# Patient Record
Sex: FEMALE | Race: WHITE | NOT HISPANIC OR LATINO | URBAN - METROPOLITAN AREA
[De-identification: names, ages, dates, MRNs, and addresses within clinical notes are randomized per-mention and may not be internally consistent; named-entity substitution may affect disease eponyms.]

---

## 2019-03-15 ENCOUNTER — EMERGENCY (EMERGENCY)
Facility: HOSPITAL | Age: 54
LOS: 1 days | Discharge: HOME | End: 2019-03-15
Attending: EMERGENCY MEDICINE | Admitting: EMERGENCY MEDICINE
Payer: COMMERCIAL

## 2019-03-15 VITALS
DIASTOLIC BLOOD PRESSURE: 75 MMHG | OXYGEN SATURATION: 95 % | HEART RATE: 71 BPM | HEIGHT: 65 IN | WEIGHT: 154.98 LBS | SYSTOLIC BLOOD PRESSURE: 106 MMHG | RESPIRATION RATE: 18 BRPM | TEMPERATURE: 96 F

## 2019-03-15 PROCEDURE — 73030 X-RAY EXAM OF SHOULDER: CPT

## 2019-03-15 PROCEDURE — 73080 X-RAY EXAM OF ELBOW: CPT

## 2019-03-15 PROCEDURE — 99284 EMERGENCY DEPT VISIT MOD MDM: CPT | Mod: 25

## 2019-03-15 PROCEDURE — 99284 EMERGENCY DEPT VISIT MOD MDM: CPT

## 2019-03-15 PROCEDURE — 73060 X-RAY EXAM OF HUMERUS: CPT

## 2019-03-15 RX ORDER — OXYCODONE AND ACETAMINOPHEN 5; 325 MG/1; MG/1
1 TABLET ORAL ONCE
Qty: 0 | Refills: 0 | Status: DISCONTINUED | OUTPATIENT
Start: 2019-03-15 | End: 2019-03-15

## 2019-03-15 RX ADMIN — OXYCODONE AND ACETAMINOPHEN 1 TABLET(S): 5; 325 TABLET ORAL at 22:47

## 2019-03-15 NOTE — ED ADULT NURSE NOTE - OBJECTIVE STATEMENT
Patient stated she fell from a kitchen stool complaining of pain to right shoulder and elbow pain, and left pain with pain level 7/10 waiting for MD evaluation.

## 2019-03-15 NOTE — ED ADULT NURSE NOTE - NSIMPLEMENTINTERV_GEN_ALL_ED
Implemented All Universal Safety Interventions:  [place] to call system. Call bell, personal items and telephone within reach. Instruct patient to call for assistance. Room bathroom lighting operational. Non-slip footwear when patient is off stretcher. Physically safe environment: no spills, clutter or unnecessary equipment. Stretcher in lowest position, wheels locked, appropriate side rails in place.

## 2019-03-16 VITALS
DIASTOLIC BLOOD PRESSURE: 66 MMHG | HEART RATE: 80 BPM | TEMPERATURE: 98 F | SYSTOLIC BLOOD PRESSURE: 100 MMHG | RESPIRATION RATE: 16 BRPM | OXYGEN SATURATION: 99 %

## 2019-03-16 PROCEDURE — 73060 X-RAY EXAM OF HUMERUS: CPT | Mod: 26,RT

## 2019-03-16 PROCEDURE — 73080 X-RAY EXAM OF ELBOW: CPT | Mod: 26,LT

## 2019-03-16 PROCEDURE — 73030 X-RAY EXAM OF SHOULDER: CPT | Mod: 26,RT

## 2019-03-16 RX ORDER — ONDANSETRON 8 MG/1
1 TABLET, FILM COATED ORAL
Qty: 30 | Refills: 0
Start: 2019-03-16

## 2019-03-16 RX ORDER — IBUPROFEN 200 MG
1 TABLET ORAL
Qty: 30 | Refills: 0
Start: 2019-03-16

## 2019-03-16 RX ADMIN — OXYCODONE AND ACETAMINOPHEN 1 TABLET(S): 5; 325 TABLET ORAL at 00:02

## 2019-03-16 NOTE — ED PROVIDER NOTE - OBJECTIVE STATEMENT
Pt is a left hand dom f who was sitting on a stool, her feet got stuck and she fell.  when she fell off she reached out to grab her fridge with her rue and injured her r shoulder. with severe pain in her shoulder she was bib family for eval.  no sig pmhx pshx not a smoker is a social drinker no allergies pmd dr radha seymour

## 2019-03-16 NOTE — ED PROVIDER NOTE - CONSTITUTIONAL, MLM
normal... Well appearing, well nourished, awake, alert, oriented to person, place, time/situation and guarding r ue on pillows

## 2019-03-16 NOTE — ED PROVIDER NOTE - DIAGNOSTIC INTERPRETATION
shoulder humerus ++ fx humeral neck no dislocation  elbow neg fx  Discussed with Patient and/or Family regarding the X-ray findings.  Discussed the risks of occult/secondary fracture or ligamentous/tendon injury. Discussed the importance of close prompt follow-up with Orthopedics for definitive workup and treatment.  Also discussed injury/neurologic precautions.  Verbalization of understanding of all instructions was shown and an opportunity was given for questions.

## 2019-03-16 NOTE — ED PROVIDER NOTE - MUSCULOSKELETAL, MLM
Spine appears normal, range of motion is limited due to apprehension, there is no wrist drop neuro vasc intact shoulder no palpable dislocation. pt has small bruise to distal elbow

## 2019-03-16 NOTE — ED PROVIDER NOTE - NSFOLLOWUPINSTRUCTIONS_ED_ALL_ED_FT
ibuprofen for mild pain  percocet for severe pain with caution!!  zofran for nausea  use sling  ice 10 minutes 4 times per day to shoulder  follow up with your orthopedist or dr burns  return for worsening symptoms or any concerns

## 2019-03-16 NOTE — ED PROVIDER NOTE - CLINICAL SUMMARY MEDICAL DECISION MAKING FREE TEXT BOX
ro fx ro dislocation manage pain refer to orhtopedist pt who fell has pain an dlimited range of motion

## 2020-09-24 ENCOUNTER — TRANSCRIPTION ENCOUNTER (OUTPATIENT)
Age: 55
End: 2020-09-24

## 2020-10-14 ENCOUNTER — TRANSCRIPTION ENCOUNTER (OUTPATIENT)
Age: 55
End: 2020-10-14

## 2022-03-16 ENCOUNTER — INPATIENT (INPATIENT)
Facility: HOSPITAL | Age: 57
LOS: 1 days | Discharge: HOME | DRG: 176 | End: 2022-03-18
Attending: FAMILY MEDICINE | Admitting: FAMILY MEDICINE
Payer: COMMERCIAL

## 2022-03-16 VITALS
HEIGHT: 65 IN | RESPIRATION RATE: 12 BRPM | HEART RATE: 109 BPM | WEIGHT: 154.98 LBS | SYSTOLIC BLOOD PRESSURE: 130 MMHG | DIASTOLIC BLOOD PRESSURE: 75 MMHG | OXYGEN SATURATION: 95 % | TEMPERATURE: 99 F

## 2022-03-16 DIAGNOSIS — I26.99 OTHER PULMONARY EMBOLISM WITHOUT ACUTE COR PULMONALE: ICD-10-CM

## 2022-03-16 LAB
ALBUMIN SERPL ELPH-MCNC: 3.6 G/DL — SIGNIFICANT CHANGE UP (ref 3.3–5)
ALP SERPL-CCNC: 79 U/L — SIGNIFICANT CHANGE UP (ref 40–120)
ALT FLD-CCNC: 29 U/L — SIGNIFICANT CHANGE UP (ref 12–78)
ANION GAP SERPL CALC-SCNC: 10 MMOL/L — SIGNIFICANT CHANGE UP (ref 5–17)
APPEARANCE UR: ABNORMAL
APTT BLD: 30.9 SEC — SIGNIFICANT CHANGE UP (ref 27.5–35.5)
AST SERPL-CCNC: 12 U/L — LOW (ref 15–37)
BASOPHILS # BLD AUTO: 0.05 K/UL — SIGNIFICANT CHANGE UP (ref 0–0.2)
BASOPHILS NFR BLD AUTO: 0.4 % — SIGNIFICANT CHANGE UP (ref 0–2)
BILIRUB SERPL-MCNC: 0.3 MG/DL — SIGNIFICANT CHANGE UP (ref 0.2–1.2)
BILIRUB UR-MCNC: NEGATIVE — SIGNIFICANT CHANGE UP
BUN SERPL-MCNC: 13 MG/DL — SIGNIFICANT CHANGE UP (ref 7–23)
CALCIUM SERPL-MCNC: 9.4 MG/DL — SIGNIFICANT CHANGE UP (ref 8.5–10.1)
CHLORIDE SERPL-SCNC: 105 MMOL/L — SIGNIFICANT CHANGE UP (ref 96–108)
CO2 SERPL-SCNC: 25 MMOL/L — SIGNIFICANT CHANGE UP (ref 22–31)
COLOR SPEC: YELLOW — SIGNIFICANT CHANGE UP
CREAT SERPL-MCNC: 0.86 MG/DL — SIGNIFICANT CHANGE UP (ref 0.5–1.3)
D DIMER BLD IA.RAPID-MCNC: 537 NG/ML DDU — HIGH
DIFF PNL FLD: ABNORMAL
EGFR: 80 ML/MIN/1.73M2 — SIGNIFICANT CHANGE UP
EOSINOPHIL # BLD AUTO: 0.06 K/UL — SIGNIFICANT CHANGE UP (ref 0–0.5)
EOSINOPHIL NFR BLD AUTO: 0.5 % — SIGNIFICANT CHANGE UP (ref 0–6)
FLUAV AG NPH QL: SIGNIFICANT CHANGE UP
FLUBV AG NPH QL: SIGNIFICANT CHANGE UP
GLUCOSE SERPL-MCNC: 110 MG/DL — HIGH (ref 70–99)
GLUCOSE UR QL: NEGATIVE — SIGNIFICANT CHANGE UP
HCG UR QL: NEGATIVE — SIGNIFICANT CHANGE UP
HCT VFR BLD CALC: 40.3 % — SIGNIFICANT CHANGE UP (ref 34.5–45)
HGB BLD-MCNC: 13.2 G/DL — SIGNIFICANT CHANGE UP (ref 11.5–15.5)
IMM GRANULOCYTES NFR BLD AUTO: 0.3 % — SIGNIFICANT CHANGE UP (ref 0–1.5)
INR BLD: 1.21 RATIO — HIGH (ref 0.88–1.16)
KETONES UR-MCNC: NEGATIVE — SIGNIFICANT CHANGE UP
LACTATE SERPL-SCNC: 0.6 MMOL/L — LOW (ref 0.7–2)
LACTATE SERPL-SCNC: 0.7 MMOL/L — SIGNIFICANT CHANGE UP (ref 0.7–2)
LACTATE SERPL-SCNC: 3.6 MMOL/L — HIGH (ref 0.7–2)
LEUKOCYTE ESTERASE UR-ACNC: ABNORMAL
LYMPHOCYTES # BLD AUTO: 27.3 % — SIGNIFICANT CHANGE UP (ref 13–44)
LYMPHOCYTES # BLD AUTO: 3.17 K/UL — SIGNIFICANT CHANGE UP (ref 1–3.3)
MCHC RBC-ENTMCNC: 28.8 PG — SIGNIFICANT CHANGE UP (ref 27–34)
MCHC RBC-ENTMCNC: 32.8 GM/DL — SIGNIFICANT CHANGE UP (ref 32–36)
MCV RBC AUTO: 88 FL — SIGNIFICANT CHANGE UP (ref 80–100)
MONOCYTES # BLD AUTO: 1.07 K/UL — HIGH (ref 0–0.9)
MONOCYTES NFR BLD AUTO: 9.2 % — SIGNIFICANT CHANGE UP (ref 2–14)
NEUTROPHILS # BLD AUTO: 7.24 K/UL — SIGNIFICANT CHANGE UP (ref 1.8–7.4)
NEUTROPHILS NFR BLD AUTO: 62.3 % — SIGNIFICANT CHANGE UP (ref 43–77)
NITRITE UR-MCNC: NEGATIVE — SIGNIFICANT CHANGE UP
NRBC # BLD: 0 /100 WBCS — SIGNIFICANT CHANGE UP (ref 0–0)
PH UR: 5 — SIGNIFICANT CHANGE UP (ref 5–8)
PLATELET # BLD AUTO: 337 K/UL — SIGNIFICANT CHANGE UP (ref 150–400)
POTASSIUM SERPL-MCNC: 3.5 MMOL/L — SIGNIFICANT CHANGE UP (ref 3.5–5.3)
POTASSIUM SERPL-SCNC: 3.5 MMOL/L — SIGNIFICANT CHANGE UP (ref 3.5–5.3)
PROT SERPL-MCNC: 8.2 G/DL — SIGNIFICANT CHANGE UP (ref 6–8.3)
PROT UR-MCNC: 30 MG/DL
PROTHROM AB SERPL-ACNC: 14.2 SEC — HIGH (ref 10.5–13.4)
RBC # BLD: 4.58 M/UL — SIGNIFICANT CHANGE UP (ref 3.8–5.2)
RBC # FLD: 12.4 % — SIGNIFICANT CHANGE UP (ref 10.3–14.5)
RSV RNA NPH QL NAA+NON-PROBE: SIGNIFICANT CHANGE UP
SARS-COV-2 RNA SPEC QL NAA+PROBE: SIGNIFICANT CHANGE UP
SODIUM SERPL-SCNC: 140 MMOL/L — SIGNIFICANT CHANGE UP (ref 135–145)
SP GR SPEC: 1.02 — SIGNIFICANT CHANGE UP (ref 1.01–1.02)
TROPONIN I, HIGH SENSITIVITY RESULT: <3 NG/L — SIGNIFICANT CHANGE UP
UROBILINOGEN FLD QL: NEGATIVE — SIGNIFICANT CHANGE UP
WBC # BLD: 11.63 K/UL — HIGH (ref 3.8–10.5)
WBC # FLD AUTO: 11.63 K/UL — HIGH (ref 3.8–10.5)

## 2022-03-16 PROCEDURE — 71275 CT ANGIOGRAPHY CHEST: CPT | Mod: 26,MA

## 2022-03-16 PROCEDURE — 74177 CT ABD & PELVIS W/CONTRAST: CPT | Mod: 26,MA

## 2022-03-16 PROCEDURE — 99285 EMERGENCY DEPT VISIT HI MDM: CPT

## 2022-03-16 PROCEDURE — 93970 EXTREMITY STUDY: CPT | Mod: 26

## 2022-03-16 PROCEDURE — 71045 X-RAY EXAM CHEST 1 VIEW: CPT | Mod: 26

## 2022-03-16 PROCEDURE — 93010 ELECTROCARDIOGRAM REPORT: CPT

## 2022-03-16 RX ORDER — ENOXAPARIN SODIUM 100 MG/ML
70 INJECTION SUBCUTANEOUS ONCE
Refills: 0 | Status: COMPLETED | OUTPATIENT
Start: 2022-03-16 | End: 2022-03-16

## 2022-03-16 RX ORDER — SODIUM CHLORIDE 9 MG/ML
1000 INJECTION INTRAMUSCULAR; INTRAVENOUS; SUBCUTANEOUS
Refills: 0 | Status: DISCONTINUED | OUTPATIENT
Start: 2022-03-16 | End: 2022-03-17

## 2022-03-16 RX ORDER — SODIUM CHLORIDE 9 MG/ML
1000 INJECTION, SOLUTION INTRAVENOUS
Refills: 0 | Status: DISCONTINUED | OUTPATIENT
Start: 2022-03-16 | End: 2022-03-16

## 2022-03-16 RX ORDER — CHOLECALCIFEROL (VITAMIN D3) 125 MCG
2000 CAPSULE ORAL
Refills: 0 | Status: DISCONTINUED | OUTPATIENT
Start: 2022-03-16 | End: 2022-03-18

## 2022-03-16 RX ORDER — KETOROLAC TROMETHAMINE 30 MG/ML
15 SYRINGE (ML) INJECTION ONCE
Refills: 0 | Status: DISCONTINUED | OUTPATIENT
Start: 2022-03-16 | End: 2022-03-16

## 2022-03-16 RX ORDER — ENOXAPARIN SODIUM 100 MG/ML
70 INJECTION SUBCUTANEOUS EVERY 12 HOURS
Refills: 0 | Status: DISCONTINUED | OUTPATIENT
Start: 2022-03-17 | End: 2022-03-17

## 2022-03-16 RX ORDER — SODIUM CHLORIDE 9 MG/ML
2200 INJECTION, SOLUTION INTRAVENOUS ONCE
Refills: 0 | Status: COMPLETED | OUTPATIENT
Start: 2022-03-16 | End: 2022-03-16

## 2022-03-16 RX ORDER — PANTOPRAZOLE SODIUM 20 MG/1
40 TABLET, DELAYED RELEASE ORAL
Refills: 0 | Status: DISCONTINUED | OUTPATIENT
Start: 2022-03-16 | End: 2022-03-18

## 2022-03-16 RX ADMIN — SODIUM CHLORIDE 2200 MILLILITER(S): 9 INJECTION, SOLUTION INTRAVENOUS at 14:40

## 2022-03-16 RX ADMIN — ENOXAPARIN SODIUM 70 MILLIGRAM(S): 100 INJECTION SUBCUTANEOUS at 18:27

## 2022-03-16 RX ADMIN — Medication 15 MILLIGRAM(S): at 14:40

## 2022-03-16 RX ADMIN — Medication 15 MILLIGRAM(S): at 15:00

## 2022-03-16 RX ADMIN — SODIUM CHLORIDE 75 MILLILITER(S): 9 INJECTION INTRAMUSCULAR; INTRAVENOUS; SUBCUTANEOUS at 20:14

## 2022-03-16 NOTE — H&P ADULT - HISTORY OF PRESENT ILLNESS
patient came to see me today,,,,had right tib fx ,dec  has immobilzer,,none surgical  stated she had ruq/flank pain with temp last night,,,sent her to the ER patient came to see me today,,,,had right tib fx ,dec  has immobilzer,,none surgical  she was not on any anticoagulation  i had not seen the patient for well over a year  stated she had ruq/flank pain with temp last night,,,sent her to the ER patient came to see me today,,,,had right tib fx ,dec  with immobilzer to the ext,,,,none surgical  she was not on any anticoagulation  i had not seen the patient for well over a year  stated she had ruq/flank pain with temp last night,,,sent her to the ER  states she was walking less,working from home and watching her dad

## 2022-03-16 NOTE — ED PROVIDER NOTE - OBJECTIVE STATEMENT
57 yo white female with H/O recent Femoral Fx treated with immobilization and immobilizer x few months, developed mild right lower back/flank pains, acute onset few days ago, worse with deep inspiration and better with shallow breaths. Pain seem to radiate somewhat to front right. Minimal cough. Non productive. No fever or chills. No nausea, vomiting or diarrhea. No dysuria or hematuria. see na d sent here by Dr. Martinez

## 2022-03-16 NOTE — ED PROVIDER NOTE - PROGRESS NOTE DETAILS
spoke with Dr. Vieira, will admit, to get lovenox, requesting pulm Dr. Taylor, was called and will consult

## 2022-03-16 NOTE — ED ADULT NURSE NOTE - OBJECTIVE STATEMENT
patient came in ED from home with c/o right flank pain radiating to the right upper abdominal area X 3 days. alert and oriented X 4. afebrile on arrival but patient stated she had 101F last night. denies chest pain or discomfort. patient noted with right leg brace r/t fx right tib. patient admit to being sedentary lately r/t recent injury.

## 2022-03-16 NOTE — H&P ADULT - NSHPLABSRESULTS_GEN_ALL_CORE
< from: CT Abdomen and Pelvis w/ IV Cont (03.16.22 @ 17:36) >    IV Contrast: Omnipaque 350  90 cc administered   10 cc discarded  Oral Contrast: NONE  Complications: None reported at time of study completion    PROCEDURE:  CT Angiography of the Chest was performed followed byportal venous phase   imaging of the Abdomen and Pelvis.  Sagittal and coronal reformats were performed as well as 3D (MIP)   reconstructions.    FINDINGS:  CHEST:  LUNGS AND LARGE AIRWAYS: Patent central airways. There is a wedge-shaped   region of patchy groundglass density in the right lower lobe posteriorly,   compatible with mild infarct. There is mild bibasilar atelectasis.  PLEURA: No pleural effusion. No pneumothorax or pneumomediastinum.  VESSELS: There is a branching tubular filling defect extending from the   right lower lobe pulmonary artery into the superior and posterior basilar   segmental branches.  No additional filling defect is identified in the   first, second, or third order branches of the pulmonary arteries. The   pulmonary trunk and main pulmonary arteries are unremarkable. The   thoracic aorta and great vessels are unremarkable. There is no evidence   of straightening of the intraventricular septum.  HEART: Heart size is normal. No pericardial effusion.  MEDIASTINUM AND ANUM: No lymphadenopathy.  CHEST WALL AND LOWER NECK: Within normal limits.    ABDOMEN AND PELVIS:  LIVER: There is a subtle 4 cm rounded region of early arterial   enhancement in the right lobe laterally, likely related to local   perfusion abnormality. Otherwise, within normal limits.  BILE DUCTS: Normal caliber.  GALLBLADDER: Contracted.  SPLEEN: Within normal limits.  PANCREAS: Within normal limits.  ADRENALS: Within normal limits.  KIDNEYS/URETERS: Within normal limits.    BLADDER: Within normal limits.  REPRODUCTIVE ORGANS: Uterus and adnexa within normal limits.    BOWEL: No bowel obstruction. Appendix is normal.  PERITONEUM: No ascites.  VESSELS: Within normal limits. The IVC and pelvic veins opacify   unremarkably.  RETROPERITONEUM/LYMPH NODES: No lymphadenopathy.  < from: CT Abdomen and Pelvis w/ IV Cont (03.16.22 @ 17:36) >    ABDOMINAL WALL: Within normal limits.  BONES: Within normal limits.    IMPRESSION: Right lower lobe pulmonary embolism with small infarct.   Findings communicated to Dr. Donis at time of interpretation.      < end of copied text >

## 2022-03-16 NOTE — H&P ADULT - PROBLEM SELECTOR PLAN 1
lovenox  no heart strain  will follow bp  pulmonary cardiology eval  hematology eval  ?fhx mom had pe,,new  patient states she hasn't been moving much due to immobilizer  protonix  sp 2 liters saline  continue iv fluids for at least 12 more hours lovenox dose given in the ER  patient was not on anticoagulation as out patient  no heart strain  will follow bp  pulmonary cardiology eval  hematology eval  ?fhx mom had pe,,new  patient states she hasn't been moving much due to immobilizer  protonix  sp 2 liters saline  continue iv fluids for at least 12 more hours lovenox dose given in the ER  patient was not on anticoagulation as out patient  no heart strain  will follow bp  add venous doppler  pulmonary cardiology eval  hematology eval  ?fhx mom had pe,,new  patient states she hasn't been moving much due to immobilizer  protonix  sp 2 liters saline  continue iv fluids for at least 12 more hours

## 2022-03-17 DIAGNOSIS — I82.409 ACUTE EMBOLISM AND THROMBOSIS OF UNSPECIFIED DEEP VEINS OF UNSPECIFIED LOWER EXTREMITY: ICD-10-CM

## 2022-03-17 LAB
A1C WITH ESTIMATED AVERAGE GLUCOSE RESULT: 5.4 % — SIGNIFICANT CHANGE UP (ref 4–5.6)
ALBUMIN SERPL ELPH-MCNC: 3.3 G/DL — SIGNIFICANT CHANGE UP (ref 3.3–5)
ALP SERPL-CCNC: 71 U/L — SIGNIFICANT CHANGE UP (ref 40–120)
ALT FLD-CCNC: 23 U/L — SIGNIFICANT CHANGE UP (ref 12–78)
ANION GAP SERPL CALC-SCNC: 7 MMOL/L — SIGNIFICANT CHANGE UP (ref 5–17)
AST SERPL-CCNC: 12 U/L — LOW (ref 15–37)
BASOPHILS # BLD AUTO: 0.04 K/UL — SIGNIFICANT CHANGE UP (ref 0–0.2)
BASOPHILS NFR BLD AUTO: 0.4 % — SIGNIFICANT CHANGE UP (ref 0–2)
BILIRUB SERPL-MCNC: 0.3 MG/DL — SIGNIFICANT CHANGE UP (ref 0.2–1.2)
BUN SERPL-MCNC: 12 MG/DL — SIGNIFICANT CHANGE UP (ref 7–23)
CALCIUM SERPL-MCNC: 9.8 MG/DL — SIGNIFICANT CHANGE UP (ref 8.5–10.1)
CHLORIDE SERPL-SCNC: 109 MMOL/L — HIGH (ref 96–108)
CHOLEST SERPL-MCNC: 148 MG/DL — SIGNIFICANT CHANGE UP
CO2 SERPL-SCNC: 26 MMOL/L — SIGNIFICANT CHANGE UP (ref 22–31)
CREAT SERPL-MCNC: 0.68 MG/DL — SIGNIFICANT CHANGE UP (ref 0.5–1.3)
CULTURE RESULTS: SIGNIFICANT CHANGE UP
EGFR: 103 ML/MIN/1.73M2 — SIGNIFICANT CHANGE UP
EOSINOPHIL # BLD AUTO: 0.08 K/UL — SIGNIFICANT CHANGE UP (ref 0–0.5)
EOSINOPHIL NFR BLD AUTO: 0.8 % — SIGNIFICANT CHANGE UP (ref 0–6)
ESTIMATED AVERAGE GLUCOSE: 108 MG/DL — SIGNIFICANT CHANGE UP (ref 68–114)
GLUCOSE SERPL-MCNC: 98 MG/DL — SIGNIFICANT CHANGE UP (ref 70–99)
HCT VFR BLD CALC: 35.1 % — SIGNIFICANT CHANGE UP (ref 34.5–45)
HCYS SERPL-MCNC: 3.6 UMOL/L — SIGNIFICANT CHANGE UP
HDLC SERPL-MCNC: 53 MG/DL — SIGNIFICANT CHANGE UP
HGB BLD-MCNC: 11.4 G/DL — LOW (ref 11.5–15.5)
IMM GRANULOCYTES NFR BLD AUTO: 0.5 % — SIGNIFICANT CHANGE UP (ref 0–1.5)
INR BLD: 1.25 RATIO — HIGH (ref 0.88–1.16)
LACTATE SERPL-SCNC: 0.8 MMOL/L — SIGNIFICANT CHANGE UP (ref 0.7–2)
LIPID PNL WITH DIRECT LDL SERPL: 78 MG/DL — SIGNIFICANT CHANGE UP
LYMPHOCYTES # BLD AUTO: 2.91 K/UL — SIGNIFICANT CHANGE UP (ref 1–3.3)
LYMPHOCYTES # BLD AUTO: 27.6 % — SIGNIFICANT CHANGE UP (ref 13–44)
MCHC RBC-ENTMCNC: 28.7 PG — SIGNIFICANT CHANGE UP (ref 27–34)
MCHC RBC-ENTMCNC: 32.5 GM/DL — SIGNIFICANT CHANGE UP (ref 32–36)
MCV RBC AUTO: 88.4 FL — SIGNIFICANT CHANGE UP (ref 80–100)
MONOCYTES # BLD AUTO: 0.9 K/UL — SIGNIFICANT CHANGE UP (ref 0–0.9)
MONOCYTES NFR BLD AUTO: 8.5 % — SIGNIFICANT CHANGE UP (ref 2–14)
NEUTROPHILS # BLD AUTO: 6.58 K/UL — SIGNIFICANT CHANGE UP (ref 1.8–7.4)
NEUTROPHILS NFR BLD AUTO: 62.2 % — SIGNIFICANT CHANGE UP (ref 43–77)
NON HDL CHOLESTEROL: 96 MG/DL — SIGNIFICANT CHANGE UP
NRBC # BLD: 0 /100 WBCS — SIGNIFICANT CHANGE UP (ref 0–0)
NT-PROBNP SERPL-SCNC: 73 PG/ML — SIGNIFICANT CHANGE UP (ref 0–125)
PLATELET # BLD AUTO: 291 K/UL — SIGNIFICANT CHANGE UP (ref 150–400)
POTASSIUM SERPL-MCNC: 3.7 MMOL/L — SIGNIFICANT CHANGE UP (ref 3.5–5.3)
POTASSIUM SERPL-SCNC: 3.7 MMOL/L — SIGNIFICANT CHANGE UP (ref 3.5–5.3)
PROCALCITONIN SERPL-MCNC: <0.05 — SIGNIFICANT CHANGE UP (ref 0–0.04)
PROT SERPL-MCNC: 7.3 G/DL — SIGNIFICANT CHANGE UP (ref 6–8.3)
PROTHROM AB SERPL-ACNC: 14.6 SEC — HIGH (ref 10.5–13.4)
RBC # BLD: 3.97 M/UL — SIGNIFICANT CHANGE UP (ref 3.8–5.2)
RBC # FLD: 12.7 % — SIGNIFICANT CHANGE UP (ref 10.3–14.5)
SODIUM SERPL-SCNC: 142 MMOL/L — SIGNIFICANT CHANGE UP (ref 135–145)
SPECIMEN SOURCE: SIGNIFICANT CHANGE UP
TRIGL SERPL-MCNC: 85 MG/DL — SIGNIFICANT CHANGE UP
TROPONIN I, HIGH SENSITIVITY RESULT: 4.2 NG/L — SIGNIFICANT CHANGE UP
TSH SERPL-MCNC: 2.44 UIU/ML — SIGNIFICANT CHANGE UP (ref 0.36–3.74)
VIT B12 SERPL-MCNC: 620 PG/ML — SIGNIFICANT CHANGE UP (ref 232–1245)
WBC # BLD: 10.56 K/UL — HIGH (ref 3.8–10.5)
WBC # FLD AUTO: 10.56 K/UL — HIGH (ref 3.8–10.5)

## 2022-03-17 PROCEDURE — 99221 1ST HOSP IP/OBS SF/LOW 40: CPT

## 2022-03-17 PROCEDURE — 93010 ELECTROCARDIOGRAM REPORT: CPT

## 2022-03-17 RX ORDER — APIXABAN 2.5 MG/1
10 TABLET, FILM COATED ORAL EVERY 12 HOURS
Refills: 0 | Status: DISCONTINUED | OUTPATIENT
Start: 2022-03-17 | End: 2022-03-18

## 2022-03-17 RX ORDER — ALPRAZOLAM 0.25 MG
0.5 TABLET ORAL AT BEDTIME
Refills: 0 | Status: DISCONTINUED | OUTPATIENT
Start: 2022-03-17 | End: 2022-03-18

## 2022-03-17 RX ORDER — INFLUENZA VIRUS VACCINE 15; 15; 15; 15 UG/.5ML; UG/.5ML; UG/.5ML; UG/.5ML
0.5 SUSPENSION INTRAMUSCULAR ONCE
Refills: 0 | Status: DISCONTINUED | OUTPATIENT
Start: 2022-03-17 | End: 2022-03-18

## 2022-03-17 RX ORDER — FAMOTIDINE 10 MG/ML
1 INJECTION INTRAVENOUS
Qty: 0 | Refills: 0 | DISCHARGE

## 2022-03-17 RX ORDER — ALPRAZOLAM 0.25 MG
0.25 TABLET ORAL AT BEDTIME
Refills: 0 | Status: DISCONTINUED | OUTPATIENT
Start: 2022-03-17 | End: 2022-03-17

## 2022-03-17 RX ORDER — ACRIVASTINE AND PSEUDOEPHEDRINE HYDROCHLORIDE 8; 60 MG/1; MG/1
1 CAPSULE ORAL
Qty: 0 | Refills: 0 | DISCHARGE

## 2022-03-17 RX ADMIN — Medication 600 MILLIGRAM(S): at 21:08

## 2022-03-17 RX ADMIN — Medication 2000 UNIT(S): at 18:21

## 2022-03-17 RX ADMIN — Medication 600 MILLIGRAM(S): at 08:17

## 2022-03-17 RX ADMIN — PANTOPRAZOLE SODIUM 40 MILLIGRAM(S): 20 TABLET, DELAYED RELEASE ORAL at 18:21

## 2022-03-17 RX ADMIN — Medication 0.5 MILLIGRAM(S): at 21:08

## 2022-03-17 RX ADMIN — APIXABAN 10 MILLIGRAM(S): 2.5 TABLET, FILM COATED ORAL at 18:21

## 2022-03-17 RX ADMIN — PANTOPRAZOLE SODIUM 40 MILLIGRAM(S): 20 TABLET, DELAYED RELEASE ORAL at 05:32

## 2022-03-17 RX ADMIN — Medication 2000 UNIT(S): at 05:32

## 2022-03-17 RX ADMIN — ENOXAPARIN SODIUM 70 MILLIGRAM(S): 100 INJECTION SUBCUTANEOUS at 05:33

## 2022-03-17 NOTE — PROGRESS NOTE ADULT - SUBJECTIVE AND OBJECTIVE BOX
[facility] Cardiology    CHIEF COMPLAINT: Patient is a 56y old  Female who presents with a chief complaint of right flank/uq pain with temp 101,last night (16 Mar 2022 19:14)      Follow Up: [ ] Chest Pain      [ ] Dyspnea     [ ] Palpitations    [ ] Atrial Fibrillation     [ ] Ventricular Dysrhythmia    [ ] Abnormal EKG                      [ ] Abnormal Cardiac Enzymes     [ ] Valvular Disease    HPI:  patient came to see me today,,,,had right tib fx ,dec  has immobilzer,,none surgical  stated she had ruq/flank pain with temp last night,,,sent her to the ER (16 Mar 2022 19:14)    PAST MEDICAL & SURGICAL HISTORY:  No pertinent past medical history  seasonal allergies  reflux    No significant past surgical history      MEDICATIONS  (STANDING):  cholecalciferol 2000 Unit(s) Oral two times a day  enoxaparin Injectable 70 milliGRAM(s) SubCutaneous every 12 hours  influenza   Vaccine 0.5 milliLiter(s) IntraMuscular once  pantoprazole    Tablet 40 milliGRAM(s) Oral two times a day  sodium chloride 0.9%. 1000 milliLiter(s) (75 mL/Hr) IV Continuous <Continuous>    MEDICATIONS  (PRN):  guaiFENesin  milliGRAM(s) Oral every 12 hours PRN Cough    Allergies    No Known Allergies    Intolerances        REVIEW OF SYSTEMS:    CONSTITUTIONAL: No weakness, fevers or chills.   EYES/ENT: No visual changes;    NECK: No pain or stiffness  RESPIRATORY: No cough, wheezing, No shortness of breath  CARDIOVASCULAR: No chest pain or palpitations  GASTROINTESTINAL: No abdominal pain, or hematochezia.  GENITOURINARY: No dysuria orhematuria  NEUROLOGICAL: No numbness or weakness  SKIN: No itching, burning, rashes  All other review of systems is negative unless indicated above    Vital Signs Last 24 Hrs  T(C): 36.8 (17 Mar 2022 08:25), Max: 37 (16 Mar 2022 12:50)  T(F): 98.3 (17 Mar 2022 08:25), Max: 98.6 (16 Mar 2022 12:50)  HR: 105 (17 Mar 2022 04:41) (93 - 109)  BP: 124/82 (17 Mar 2022 04:41) (124/82 - 142/69)  BP(mean): --  RR: 20 (17 Mar 2022 04:41) (12 - 20)  SpO2: 90% (17 Mar 2022 04:41) (90% - 98%)  I&O's Summary      PHYSICAL EXAM:  Constitutional: NAD  Neurological: Alert, no focal deficits  HEENT: no JVD, EOMI  Cardiovascular: Regular, S1 and S2, no murmur  Pulmonary: breath sounds bilaterally  Gastrointestinal: Bowel Sounds present, soft, nontender  EXT:  no peripheral edema  Skin: No rashes.  Psych:  Mood calm  LABS: All Labs Reviewed:                          11.4   10.56 )-----------( 291      ( 17 Mar 2022 07:39 )             35.1     03-17    142  |  109<H>  |  12  ----------------------------<  98  3.7   |  26  |  0.68    Ca    9.8      17 Mar 2022 07:39    TPro  7.3  /  Alb  3.3  /  TBili  0.3  /  DBili  x   /  AST  12<L>  /  ALT  23  /  AlkPhos  71  03-17    PT/INR - ( 17 Mar 2022 07:39 )   PT: 14.6 sec;   INR: 1.25 ratio         PTT - ( 16 Mar 2022 14:00 )  PTT:30.9 sec      Xray Chest 1 View-PORTABLE IMMEDIATE:   ACC: [id number] EXAM:  XR CHEST PORTABLE IMMED 1V                          PROCEDURE DATE:  03/16/2022      INTERPRETATION:  Portable chest radiograph    CLINICAL INFORMATION: Sepsis    TECHNIQUE:  Portable  AP chest radiograph.    COMPARISON: None. .    FINDINGS:  CATHETERS AND TUBES: None    PULMONARY: LEFT lung base linear discoid atelectasis.  Remaining visualized lung parenchyma clear. Posterior lung bases cannot   be assessed on AP view..   No pneumothorax.    HEART/VASCULAR: The heart and mediastinum size and configuration are   within normal limits.    BONES: Visualized osseous structures are intact.    IMPRESSION:   LEFT lung base linear discoid atelectasis. Posterior lung   bases cannot be assessed on single AP view.    Lateral or bilateral decubitus radiographs recommended to exclude   posterior lung base pathology..    --- End of Report ---      RENZO MONTERO MD; Attending Radiologist  This document has been electronically signed. Mar 16 2022  9:12PM (03-16-22 @ 14:23)    56 F a/w pleuritic CP, pulmonary embolism  sinus tach  on Xarelto  echo ordered, repeat ekg ordered  pt can f/u with me outpt [phone] for risk stratification

## 2022-03-17 NOTE — PROGRESS NOTE ADULT - SUBJECTIVE AND OBJECTIVE BOX
PAST MEDICAL & SURGICAL HISTORY:  No pertinent past medical history  seasonal allergies  reflux    No significant past surgical history        MEDICATIONS  (STANDING):  cholecalciferol 2000 Unit(s) Oral two times a day  enoxaparin Injectable 70 milliGRAM(s) SubCutaneous every 12 hours  influenza   Vaccine 0.5 milliLiter(s) IntraMuscular once  pantoprazole    Tablet 40 milliGRAM(s) Oral two times a day  sodium chloride 0.9%. 1000 milliLiter(s) (75 mL/Hr) IV Continuous <Continuous>    MEDICATIONS  (PRN):  ALPRAZolam 0.25 milliGRAM(s) Oral at bedtime PRN sleep  guaiFENesin  milliGRAM(s) Oral every 12 hours PRN Cough      Patient is a 56y old  Female who presents with a chief complaint of right flank/uq pain with temp 101,last night (17 Mar 2022 11:12)      Vital Signs Last 24 Hrs  T(C): 36.7 (17 Mar 2022 12:23), Max: 37 (16 Mar 2022 12:50)  T(F): 98.1 (17 Mar 2022 12:23), Max: 98.6 (16 Mar 2022 12:50)  HR: 96 (17 Mar 2022 12:23) (93 - 109)  BP: 132/86 (17 Mar 2022 12:23) (124/82 - 142/69)  BP(mean): --  RR: 20 (17 Mar 2022 12:23) (12 - 20)  SpO2: 90% (17 Mar 2022 12:23) (90% - 98%)            REVIEW OF SYSTEMS:    Constitutional: No fever, weight loss or fatigue  Eyes: No eye pain, visual disturbances, or discharge  ENT:  No difficulty hearing, tinnitus, vertigo; No sinus or throat pain  Neck: No pain or stiffness  Breasts: No pain, masses or nipple discharge  Respiratory: No cough, wheezing, chills or hemoptysis  Cardiovascular: No chest pain, palpitations, shortness of breath, dizziness or leg swelling  Gastrointestinal: No abdominal or epigastric pain. No nausea, vomiting or hematemesis; No diarrhea or constipation. No melena or hematochezia.  Genitourinary: No dysuria, frequency, hematuria or incontinence  Rectal: No pain, hemorrhoids or incontinence  Neurological: No headaches, memory loss, loss of strength, numbness or tremors  Skin: No itching, burning, rashes or lesions   Lymph Nodes: No enlarged glands  Endocrine: No heat or cold intolerance; No hair loss  Musculoskeletal: No joint pain or swelling; No muscle, back or extremity pain  Psychiatric: No depression, anxiety, mood swings or difficulty sleeping  Heme/Lymph: No easy bruising or bleeding gums  Allergy and Immunologic: No hives or eczema    PHYSICAL EXAM:    Constitutional: NAD, well-groomed, well-developed  HEENT: PERRLA, EOMI, Normal Hearing, MMM  Neck: No LAD, No JVD  Back: Normal spine flexure, No CVA tenderness  Respiratory: CTAB/L   Cardiovascular: S1 and S2, RRR, no M/G/R  Gastrointestinal: BS+, soft, NT/ND  Extremities: No peripheral edema  Vascular: 2+ peripheral pulses  Neurological: A/O x 3, no focal deficits  Skin: No rashes      decubiti:                           11.4   10.56 )-----------( 291      ( 17 Mar 2022 07:39 )             35.1         142  |  109<H>  |  12  ----------------------------<  98  3.7   |  26  |  0.68    Ca    9.8      17 Mar 2022 07:39    TPro  7.3  /  Alb  3.3  /  TBili  0.3  /  DBili  x   /  AST  12<L>  /  ALT  23  /  AlkPhos  71      PT/INR - ( 17 Mar 2022 07:39 )   PT: 14.6 sec;   INR: 1.25 ratio         PTT - ( 16 Mar 2022 14:00 )  PTT:30.9 sec  Urinalysis Basic - ( 16 Mar 2022 14:00 )    Color: Yellow / Appearance: Slightly Turbid / S.025 / pH: x  Gluc: x / Ketone: Negative  / Bili: Negative / Urobili: Negative   Blood: x / Protein: 30 mg/dL / Nitrite: Negative   Leuk Esterase: Small / RBC: 0-2 /HPF / WBC 0-2   Sq Epi: x / Non Sq Epi: Occasional / Bacteria: Occasional           @ 11:25      B12:  620   BNP:  73              Radiology:  < from: US Duplex Venous Lower Ext Complete, Bilateral (22 @ 19:01) >    INTERPRETATION:  CLINICAL INFORMATION: Recent pulmonary embolism.   Evaluate for lower extremity DVT.    COMPARISON: No prior venous lower extremity sonographic evaluations   available for comparison.    TECHNIQUE: Duplex sonography of the BILATERAL LOWER extremity veins with   color and spectral Doppler, with and without compression.    FINDINGS:    RIGHT:  Acute DVT is identified within the right common femoral vein, femoral   vein, popliteal vein as well as visualized segments of the posterior   tibial vein and peroneal vein.    LEFT:  Normal compressibility of the LEFT common femoral, femoral and popliteal   veins.  Doppler examination shows normal spontaneous and phasic flow.  No LEFT calf vein thrombosis is detected.    IMPRESSION:  Extensive acute DVT right lower extremity. No evidence for left lower   extremity DVT.  < from: CT Angio Chest PE Protocol w/ IV Cont (22 @ 17:36) >    IMPRESSION: Right lower lobe pulmonary embolism with small infarct.   Findings communicated to Dr. Donis at time of interpretation.      < end of copied text >       PAST MEDICAL & SURGICAL HISTORY:  No pertinent past medical history  seasonal allergies  reflux    No significant past surgical history        MEDICATIONS  (STANDING):  cholecalciferol 2000 Unit(s) Oral two times a day  enoxaparin Injectable 70 milliGRAM(s) SubCutaneous every 12 hours  influenza   Vaccine 0.5 milliLiter(s) IntraMuscular once  pantoprazole    Tablet 40 milliGRAM(s) Oral two times a day  sodium chloride 0.9%. 1000 milliLiter(s) (75 mL/Hr) IV Continuous <Continuous>    MEDICATIONS  (PRN):  ALPRAZolam 0.25 milliGRAM(s) Oral at bedtime PRN sleep  guaiFENesin  milliGRAM(s) Oral every 12 hours PRN Cough      Patient is a 56y old  Female who presents with a chief complaint of right flank/uq pain with temp 101,last night (17 Mar 2022 11:12)      Vital Signs Last 24 Hrs  T(C): 36.7 (17 Mar 2022 12:23), Max: 37 (16 Mar 2022 12:50)  T(F): 98.1 (17 Mar 2022 12:23), Max: 98.6 (16 Mar 2022 12:50)  HR: 96 (17 Mar 2022 12:23) (93 - 109)  BP: 132/86 (17 Mar 2022 12:23) (124/82 - 142/69)  BP(mean): --  RR: 20 (17 Mar 2022 12:23) (12 - 20)  SpO2: 90% (17 Mar 2022 12:23) (90% - 98%)            REVIEW OF SYSTEMS:    Constitutional: No fever, weight loss or fatigue  Eyes: No eye pain, visual disturbances, or discharge  ENT:  No difficulty hearing, tinnitus, vertigo; No sinus or throat pain  Neck: No pain or stiffness  Breasts: No pain, masses or nipple discharge  Respiratory: No cough, wheezing, chills or hemoptysis  Cardiovascular: No chest pain, palpitations, shortness of breath, dizziness or leg swelling  Gastrointestinal: No abdominal or epigastric pain. No nausea, vomiting or hematemesis; No diarrhea or constipation. No melena or hematochezia.  Genitourinary: No dysuria, frequency, hematuria or incontinence  Rectal: No pain, hemorrhoids or incontinence  Neurological: No headaches, memory loss, loss of strength, numbness or tremors  Skin: No itching, burning, rashes or lesions   Lymph Nodes: No enlarged glands  Endocrine: No heat or cold intolerance; No hair loss  Musculoskeletal: No joint pain or swelling; No muscle, back or extremity pain  Psychiatric: No depression, anxiety, mood swings or difficulty sleeping  Heme/Lymph: No easy bruising or bleeding gums  Allergy and Immunologic: No hives or eczema    PHYSICAL EXAM:  aox3  interactive  in no distress  Respiratory: CTAB/L   Cardiovascular: S1 and S2, RRR, no M/G/R  Gastrointestinal: BS+, soft, NT/ND  Extremities: mild edema right lower ext  Neurological: A/O x 3, no focal deficits  Skin: No rashes      decubiti: no                          11.4   10.56 )-----------( 291      ( 17 Mar 2022 07:39 )             35.1         142  |  109<H>  |  12  ----------------------------<  98  3.7   |  26  |  0.68    Ca    9.8      17 Mar 2022 07:39    TPro  7.3  /  Alb  3.3  /  TBili  0.3  /  DBili  x   /  AST  12<L>  /  ALT  23  /  AlkPhos  71      PT/INR - ( 17 Mar 2022 07:39 )   PT: 14.6 sec;   INR: 1.25 ratio         PTT - ( 16 Mar 2022 14:00 )  PTT:30.9 sec  Urinalysis Basic - ( 16 Mar 2022 14:00 )    Color: Yellow / Appearance: Slightly Turbid / S.025 / pH: x  Gluc: x / Ketone: Negative  / Bili: Negative / Urobili: Negative   Blood: x / Protein: 30 mg/dL / Nitrite: Negative   Leuk Esterase: Small / RBC: 0-2 /HPF / WBC 0-2   Sq Epi: x / Non Sq Epi: Occasional / Bacteria: Occasional           @ 11:25      B12:  620   BNP:  73              Radiology:  < from: US Duplex Venous Lower Ext Complete, Bilateral (22 @ 19:01) >    INTERPRETATION:  CLINICAL INFORMATION: Recent pulmonary embolism.   Evaluate for lower extremity DVT.    COMPARISON: No prior venous lower extremity sonographic evaluations   available for comparison.    TECHNIQUE: Duplex sonography of the BILATERAL LOWER extremity veins with   color and spectral Doppler, with and without compression.    FINDINGS:    RIGHT:  Acute DVT is identified within the right common femoral vein, femoral   vein, popliteal vein as well as visualized segments of the posterior   tibial vein and peroneal vein.    LEFT:  Normal compressibility of the LEFT common femoral, femoral and popliteal   veins.  Doppler examination shows normal spontaneous and phasic flow.  No LEFT calf vein thrombosis is detected.    IMPRESSION:  Extensive acute DVT right lower extremity. No evidence for left lower   extremity DVT.  < from: CT Angio Chest PE Protocol w/ IV Cont (22 @ 17:36) >    IMPRESSION: Right lower lobe pulmonary embolism with small infarct.   Findings communicated to Dr. Donis at time of interpretation.      < end of copied text >

## 2022-03-17 NOTE — CONSULT NOTE ADULT - ASSESSMENT
55 y/o female with an acute RLL PE and Extensive RLE DVT after recent Right tibia fracture and knee immobilization.      1. Continue anticoagulation  2. Recommend CT Venogram of the abdomen and pelvis  3. Discussed with Dr. Lockwood who agrees.
  LAUREN BUTTERFIELD 3/16/2022 56 f 1965 DR KAJAL TIERNEY     Initial evaluation/Pulmonary Critical Care consultation requested on  3/16/2022 by Dr TIERNEY    from Dr Taylor   Patient examined chart reviewed    HOSPITAL ADMISSION   PATIENT CAME  FROM (if information available)      LAUREN BUTTERFIELD 3/16/2022 56 f 1965 DR KAJAL TIERNEY     REVIEW OF SYMPTOMS      Able to give ROS  Yes     RELIABLE +/-   CONSTITUTIONAL Weakness Yes  Chills No   ENDOCRINE  No heat or cold intolerance    ALLERGY No hives  Sore throat No stridor  RESP Coughing blood no  Shortness of breath YES   NEURO No Headache  Confusion Pain neck No   CARDIAC No Chest pain No Palpitations   GI  Pain abdomen NO   Vomiting NO     PHYSICAL EXAM    HEENT Unremarkable  atraumatic   RESP Fair air entry EXP prolonged    Harsh breath sound Resp distres mild   CARDIAC S1 S2 No S3     NO JVD    ABDOMEN SOFT BS PRESENT NOT DISTENDED No hepatosplenomegaly PEDAL EDEMA present No calf tenderness  NO rash       DOA/CC/PROBLEMS poa .  3/16/2022  R fl.ank pain radiates r side abd x 2 d   fever 3/.15      PMH-PSH .  pmh HO tibia fx  in immobil.izer x several weeks   pmh     PROBLEMS .  Nonmassivepulmonary embolism triggered by transient adam risk factor 3/16/2022       COVID/ICU/CODE STATUS.                       COVID  STATUS.           ICU STAY. none  GOC.  3/16/2022 full code    BEST PRACTICE ISSUES.                                                  HEAD OF BED ELEVATION. Yes  DVT PROPHYLAXIS.    3/16/2022 lvnx 70.2 nonmassive pe     GENERAL ISSUES .                                       ALLERGY.            nka                WEIGHT.                3/16/2022 70                    BMI.                        3/16/2022 25           VITALS/PO/IO/VENT/DRIPS.     3/16/2022 afeb 100 130/70         PROBLEM DATA/PLAN.    HEMODYNAMICS.   Monitor bp Target MAP 65 (+)    RESP.   Monitor po Target po 90-95%      PMH/PSH PROBLEMS.  Management continued/modified as indicated    OXYGEN REQUIREMENTS.  3/16/2022 ra 95%    INFECTION  w 3/16/2022 w 11.6  ua 3/16/2022 w 0-2   flu ab 3/16/2022 (-)  rsv 3/16/2022 (-)   ar Check blod c urine c   Follow serially    LACTICEMIA  la 3/16/2022 la 3.6   IV fluids    VENOUS THROMBOEMBOLISM  D-dimr 3/16/2022 dd 537   Cr 3/16/2022 Cr 8  pregnancy prof 3/16/2022 (-)   cta ch abd pelvis 3/16/2022 ct   wedge shaped patchy gg density rll post cw mild infarct   Mild basal atelectasis   rll pulm artery filling defect   AR  transient major risk factor for   pe (immobility, tibia fx)   Nonmassive pe  spesi score 0   No reason to consider tpa   3/16/2022 6:36 PM pt already given lovenox 70   3/16/2022 Cont lovenox 702   3/16/2022 check echo and v duplx   3/16/2022 needs minimum 3 m anticoagn       TIME SPENT   Over 55 minutes aggregate care time spent on encounter; activities included   direct patient care, counseling and/or coordinating care reviewing notes, lab data/ imaging , discussion with multidisciplinary team/ patient  /family and explaining in detail risks, benefits, alternatives  of the recommendations     LAUREN BUTTERFIELD 3/16/2022 56 f 1965 DR KAJAL TIERNEY     
Provoked right leg DVT with PE related to immobility from recent tibial fracture (no surgery performed)..  mother with history of DVT and PE    Recommendations:  1.  continue lovenox  2.  vascular surgery evaluation with extensive nature of thrombosis  3.  when cleared by vascular can bridge to DOAC  4.  will do hypercoagulable workup as outpatient with family history  5.  further heme recommendations pending above

## 2022-03-17 NOTE — PROGRESS NOTE ADULT - ASSESSMENT
LAUREN BUTTERFIELD 3/16/2022 56 f 1965 DR KAJAL TIERNEY     REVIEW OF SYMPTOMS      Able to give ROS  Yes     RELIABLE +/-   CONSTITUTIONAL Weakness Yes  Chills No   ENDOCRINE  No heat or cold intolerance    ALLERGY No hives  Sore throat No stridor  RESP Coughing blood no  Shortness of breath YES   NEURO No Headache  Confusion Pain neck No   CARDIAC No Chest pain No Palpitations   GI  Pain abdomen NO   Vomiting NO     PHYSICAL EXAM    HEENT Unremarkable  atraumatic   RESP Fair air entry EXP prolonged    Harsh breath sound Resp distres mild   CARDIAC S1 S2 No S3     NO JVD    ABDOMEN SOFT BS PRESENT NOT DISTENDED No hepatosplenomegaly PEDAL EDEMA present No calf tenderness  NO rash       DOA/CC/PROBLEMS poa .  3/16/2022  R fl.ank pain radiates r side abd x 2 d   fever 3/.15      PMH-PSH .  pmh HO tibia fx  in immobil.izer x several weeks   pmh     PROBLEMS .  Nonmassivepulmonary embolism triggered by transient adam risk factor 3/16/2022       COVID/ICU/CODE STATUS.                       COVID  STATUS.           ICU STAY. none  GOC.  3/16/2022 full code    BEST PRACTICE ISSUES.                                                  HEAD OF BED ELEVATION. Yes  DVT PROPHYLAXIS.      3/17/2022 apixaban 10.2 nonmassive pe   3/16/2022 lvnx 70.2 nonmassive pe   ANDREWS PROPHYLAXIS.                                                                                          VITALS/PO/IO/VENT/DRIPS.    3/17/2022 afeb 96 130/80       PROBLEM DATA/PLAN.    HEMODYNAMICS.   Monitor bp Target MAP 65 (+)    RESP.   Monitor po Target po 90-95%      PMH/PSH PROBLEMS.  Management continued/modified as indicated    OXYGEN REQUIREMENTS.  3/17/2022 ra 90%    INFECTION  w 3/16-3/17/2022 w 11.6-10  pr 3/17/2022 (-)  ua 3/16/2022 w 0-2   flu ab 3/16/2022 (-)  rsv 3/16/2022 (-)   infection unlikely    LACTICEMIA  la 3/16-3/17/2022 la 3.6 - .8     VENOUS THROMBOEMBOLISM  D-dimr 3/16/2022 dd 537   Cr 3/16/2022 Cr 8  pregnancy prof 3/16/2022 (-)   tr 3/17/2022 (-)   bnp 3/17/2022 73  v duplx  3/17/2022 extensive ac dvt rle   cta ch abd pelvis 3/16/2022 ct   wedge shaped patchy gg density rll post cw mild infarct   Mild basal atelectasis   rll pulm artery filling defect   AR  transient major risk factor for   pe (immobility, tibia fx)   Nonmassive pe  spesi score 0   No reason to consider tpa   3/16/2022 6:36 PM pt already given lovenox 70   3/16/2022 Cont lovenox 702   3/16/2022 check echo and v duplx   3/16/2022 needs minimum 3 m anticoagn   3/17/2022 agree to change to doac and dc 3/18      TIME SPENT   Over 25 minutes aggregate care time spent on encounter; activities included   direct patient care, counseling and/or coordinating care reviewing notes, lab data/ imaging , discussion with multidisciplinary team/ patient  /family and explaining in detail risks, benefits, alternatives  of the recommendations     LAUREN BUTTERFIELD 3/16/2022 56 f 1965 DR KAJAL TIERNEY

## 2022-03-17 NOTE — PROGRESS NOTE ADULT - PROBLEM SELECTOR PLAN 1
with rle dvt  post right tibial fx from december  was not on anticoagulation  currently on lovenox  seen by pulm and cardiology  hematology to be seen  will discuss when to switch to xarelto  bp stable  dc iv fluids  no chest pian/shortness of breath with rle dvt  post right tibial fx from december  was not on anticoagulation  currently on lovenox  seen by pulm and cardiology  hematology to be seen  ? vascular,,will discuss with hematology  patient is not complaining of leg pain/shortness of breath/chest pain  will discuss when to switch to xarelto or eliquis  bp stable  dc iv fluids  no chest pian/shortness of breath

## 2022-03-17 NOTE — CONSULT NOTE ADULT - ATTENDING COMMENTS
Patients CTA chest performed 2 days ago include the iliac segments. NO evidence of central DVT. No further testing needed. Recommend AC. No further surgical intervention needed at this point.

## 2022-03-17 NOTE — PROGRESS NOTE ADULT - SUBJECTIVE AND OBJECTIVE BOX
GREER AGUILAR    V TELN 328 D1    Allergies    No Known Allergies    Intolerances        PAST MEDICAL & SURGICAL HISTORY:  No pertinent past medical history  seasonal allergies  reflux    No significant past surgical history        FAMILY HISTORY:      Home Medications:  Pepcid 40 mg oral tablet: 1 tab(s) orally once a day (at bedtime), As Needed (15 Mar 2019 21:40)  Semprex-D 8 mg-60 mg oral capsule: 1 cap(s) orally once a day, As Needed (15 Mar 2019 21:40)      MEDICATIONS  (STANDING):  cholecalciferol 2000 Unit(s) Oral two times a day  enoxaparin Injectable 70 milliGRAM(s) SubCutaneous every 12 hours  influenza   Vaccine 0.5 milliLiter(s) IntraMuscular once  pantoprazole    Tablet 40 milliGRAM(s) Oral two times a day  sodium chloride 0.9%. 1000 milliLiter(s) (75 mL/Hr) IV Continuous <Continuous>    MEDICATIONS  (PRN):  guaiFENesin  milliGRAM(s) Oral every 12 hours PRN Cough      Diet, Regular (22 @ 19:26) [Active]          Vital Signs Last 24 Hrs  T(C): 36.8 (17 Mar 2022 08:25), Max: 37 (16 Mar 2022 12:50)  T(F): 98.3 (17 Mar 2022 08:25), Max: 98.6 (16 Mar 2022 12:50)  HR: 105 (17 Mar 2022 04:41) (93 - 109)  BP: 124/82 (17 Mar 2022 04:41) (124/82 - 142/69)  BP(mean): --  RR: 20 (17 Mar 2022 04:41) (12 - 20)  SpO2: 90% (17 Mar 2022 04:41) (90% - 98%)              LABS:                        11.4   10.56 )-----------( 291      ( 17 Mar 2022 07:39 )             35.1     03-    142  |  109<H>  |  12  ----------------------------<  98  3.7   |  26  |  0.68    Ca    9.8      17 Mar 2022 07:39    TPro  7.3  /  Alb  3.3  /  TBili  0.3  /  DBili  x   /  AST  12<L>  /  ALT  23  /  AlkPhos  71      PT/INR - ( 17 Mar 2022 07:39 )   PT: 14.6 sec;   INR: 1.25 ratio         PTT - ( 16 Mar 2022 14:00 )  PTT:30.9 sec  Urinalysis Basic - ( 16 Mar 2022 14:00 )    Color: Yellow / Appearance: Slightly Turbid / S.025 / pH: x  Gluc: x / Ketone: Negative  / Bili: Negative / Urobili: Negative   Blood: x / Protein: 30 mg/dL / Nitrite: Negative   Leuk Esterase: Small / RBC: 0-2 /HPF / WBC 0-2   Sq Epi: x / Non Sq Epi: Occasional / Bacteria: Occasional            WBC:  WBC Count: 10.56 K/uL ( @ 07:39)  WBC Count: 11.63 K/uL ( @ 14:00)      MICROBIOLOGY:  RECENT CULTURES:              PT/INR - ( 17 Mar 2022 07:39 )   PT: 14.6 sec;   INR: 1.25 ratio         PTT - ( 16 Mar 2022 14:00 )  PTT:30.9 sec    Sodium:  Sodium, Serum: 142 mmol/L ( @ 07:39)  Sodium, Serum: 140 mmol/L ( @ 14:00)      0.68 mg/dL  @ 07:39  0.86 mg/dL  @ 14:00      Hemoglobin:  Hemoglobin: 11.4 g/dL ( @ 07:39)  Hemoglobin: 13.2 g/dL ( @ 14:00)      Platelets: Platelet Count - Automated: 291 K/uL ( @ 07:39)  Platelet Count - Automated: 337 K/uL ( @ 14:00)      LIVER FUNCTIONS - ( 17 Mar 2022 07:39 )  Alb: 3.3 g/dL / Pro: 7.3 g/dL / ALK PHOS: 71 U/L / ALT: 23 U/L / AST: 12 U/L / GGT: x             Urinalysis Basic - ( 16 Mar 2022 14:00 )    Color: Yellow / Appearance: Slightly Turbid / S.025 / pH: x  Gluc: x / Ketone: Negative  / Bili: Negative / Urobili: Negative   Blood: x / Protein: 30 mg/dL / Nitrite: Negative   Leuk Esterase: Small / RBC: 0-2 /HPF / WBC 0-2   Sq Epi: x / Non Sq Epi: Occasional / Bacteria: Occasional        RADIOLOGY & ADDITIONAL STUDIES:      MICROBIOLOGY:  RECENT CULTURES:

## 2022-03-17 NOTE — PATIENT PROFILE ADULT - FALL HARM RISK - HARM RISK INTERVENTIONS

## 2022-03-17 NOTE — CONSULT NOTE ADULT - SUBJECTIVE AND OBJECTIVE BOX
GREER ROMENTON    [facility] 24    Allergies    No Known Allergies    Intolerances        PAST MEDICAL & SURGICAL HISTORY:  No pertinent past medical history    No significant past surgical history        FAMILY HISTORY:      Home Medications:  Pepcid 40 mg oral tablet: 1 tab(s) orally once a day (at bedtime), As Needed (15 Mar 2019 21:40)  Semprex-D 8 mg-60 mg oral capsule: 1 cap(s) orally once a day, As Needed (15 Mar 2019 21:40)      MEDICATIONS  (STANDING):  enoxaparin Injectable 70 milliGRAM(s) SubCutaneous every 12 hours  lactated ringers. 1000 milliLiter(s) (80 mL/Hr) IV Continuous <Continuous>    MEDICATIONS  (PRN):              Vital Signs Last 24 Hrs  T(C): 37 (16 Mar 2022 12:50), Max: 37 (16 Mar 2022 12:50)  T(F): 98.6 (16 Mar 2022 12:50), Max: 98.6 (16 Mar 2022 12:50)  HR: 109 (16 Mar 2022 12:50) (109 - 109)  BP: 130/75 (16 Mar 2022 12:50) (130/75 - 130/75)  BP(mean): --  RR: 12 (16 Mar 2022 12:50) (12 - 12)  SpO2: 95% (16 Mar 2022 12:50) (95% - 95%)              LABS:                        13.2   11.63 )-----------( 337      ( 16 Mar 2022 14:00 )             40.3     03-16    140  |  105  |  13  ----------------------------<  110<H>  3.5   |  25  |  0.86    Ca    9.4      16 Mar 2022 14:00    TPro  8.2  /  Alb  3.6  /  TBili  0.3  /  DBili  x   /  AST  12<L>  /  ALT  29  /  AlkPhos  79  03-16    PT/INR - ( 16 Mar 2022 14:00 )   PT: 14.2 sec;   INR: 1.21 ratio         PTT - ( 16 Mar 2022 14:00 )  PTT:30.9 sec  Urinalysis Basic - ( 16 Mar 2022 14:00 )    Color: Yellow / Appearance: Slightly Turbid / S.025 / pH: x  Gluc: x / Ketone: Negative  / Bili: Negative / Urobili: Negative   Blood: x / Protein: 30 mg/dL / Nitrite: Negative   Leuk Esterase: Small / RBC: 0-2 /HPF / WBC 0-2   Sq Epi: x / Non Sq Epi: Occasional / Bacteria: Occasional            WBC:  WBC Count: 11.63 K/uL ( @ 14:00)      MICROBIOLOGY:  RECENT CULTURES:              PT/INR - ( 16 Mar 2022 14:00 )   PT: 14.2 sec;   INR: 1.21 ratio         PTT - ( 16 Mar 2022 14:00 )  PTT:30.9 sec    Sodium:  Sodium, Serum: 140 mmol/L ( @ 14:00)      0.86 mg/dL  @ 14:00      Hemoglobin:  Hemoglobin: 13.2 g/dL ( @ 14:00)      Platelets: Platelet Count - Automated: 337 K/uL ( @ 14:00)      LIVER FUNCTIONS - ( 16 Mar 2022 14:00 )  Alb: 3.6 g/dL / Pro: 8.2 g/dL / ALK PHOS: 79 U/L / ALT: 29 U/L / AST: 12 U/L / GGT: x             Urinalysis Basic - ( 16 Mar 2022 14:00 )    Color: Yellow / Appearance: Slightly Turbid / S.025 / pH: x  Gluc: x / Ketone: Negative  / Bili: Negative / Urobili: Negative   Blood: x / Protein: 30 mg/dL / Nitrite: Negative   Leuk Esterase: Small / RBC: 0-2 /HPF / WBC 0-2   Sq Epi: x / Non Sq Epi: Occasional / Bacteria: Occasional        RADIOLOGY & ADDITIONAL STUDIES:      MICROBIOLOGY:  RECENT CULTURES:          
Patient is a 56y old  Female who presents with a chief complaint of right flank/uq pain with temp 101,last night (17 Mar 2022 12:28)      HPI:  patient came to see me today,,,,had right tib fx ,dec  has immobilzer,,none surgical  she was not on any anticoagulation  i had not seen the patient for well over a year  stated she had ruq/flank pain with temp last night,,,sent her to the ER (16 Mar 2022 19:14)       ROS:  Negative except for:    PAST MEDICAL & SURGICAL HISTORY:  No pertinent past medical history  seasonal allergies  reflux    No significant past surgical history        SOCIAL HISTORY:    FAMILY HISTORY:      MEDICATIONS  (STANDING):  cholecalciferol 2000 Unit(s) Oral two times a day  enoxaparin Injectable 70 milliGRAM(s) SubCutaneous every 12 hours  influenza   Vaccine 0.5 milliLiter(s) IntraMuscular once  pantoprazole    Tablet 40 milliGRAM(s) Oral two times a day    MEDICATIONS  (PRN):  ALPRAZolam 0.5 milliGRAM(s) Oral at bedtime PRN for sleep  guaiFENesin  milliGRAM(s) Oral every 12 hours PRN Cough      Allergies    No Known Allergies    Intolerances        Vital Signs Last 24 Hrs  T(C): 36.7 (17 Mar 2022 12:23), Max: 36.8 (16 Mar 2022 21:53)  T(F): 98.1 (17 Mar 2022 12:23), Max: 98.3 (16 Mar 2022 21:53)  HR: 96 (17 Mar 2022 12:23) (93 - 105)  BP: 132/86 (17 Mar 2022 12:23) (124/82 - 142/69)  BP(mean): --  RR: 20 (17 Mar 2022 12:23) (20 - 20)  SpO2: 90% (17 Mar 2022 12:23) (90% - 98%)    PHYSICAL EXAM  General: adult in NAD  HEENT: clear oropharynx, anicteric sclera, pink conjunctivae  Neck: supple  CV: normal S1S2 with no murmur rubs or gallops  Lungs: clear to auscultation, no wheezes, no rhales  Abdomen: soft non-tender non-distended, no hepato/splenomegaly  Ext: no clubbing cyanosis or edema  Skin: no rashes and no petichiae  Neuro: alert and oriented X3 no focal deficits      LABS:    CBC Full  -  ( 17 Mar 2022 07:39 )  WBC Count : 10.56 K/uL  RBC Count : 3.97 M/uL  Hemoglobin : 11.4 g/dL  Hematocrit : 35.1 %  Platelet Count - Automated : 291 K/uL  Mean Cell Volume : 88.4 fl  Mean Cell Hemoglobin : 28.7 pg  Mean Cell Hemoglobin Concentration : 32.5 gm/dL  Auto Neutrophil # : 6.58 K/uL  Auto Lymphocyte # : 2.91 K/uL  Auto Monocyte # : 0.90 K/uL  Auto Eosinophil # : 0.08 K/uL  Auto Basophil # : 0.04 K/uL  Auto Neutrophil % : 62.2 %  Auto Lymphocyte % : 27.6 %  Auto Monocyte % : 8.5 %  Auto Eosinophil % : 0.8 %  Auto Basophil % : 0.4 %    03-17    142  |  109<H>  |  12  ----------------------------<  98  3.7   |  26  |  0.68    Ca    9.8      17 Mar 2022 07:39    TPro  7.3  /  Alb  3.3  /  TBili  0.3  /  DBili  x   /  AST  12<L>  /  ALT  23  /  AlkPhos  71  03-17    PT/INR - ( 17 Mar 2022 07:39 )   PT: 14.6 sec;   INR: 1.25 ratio         PTT - ( 16 Mar 2022 14:00 )  PTT:30.9 sec          BLOOD SMEAR INTERPRETATION:    RADIOLOGY & ADDITIONAL STUDIES:  < from: US Duplex Venous Lower Ext Complete, Bilateral (03.16.22 @ 19:01) >  ACC: [id number] EXAM:  US DPLX LWR EXT VEINS COMPL BI                          PROCEDURE DATE:  03/16/2022          INTERPRETATION:  CLINICAL INFORMATION: Recent pulmonary embolism.   Evaluate for lower extremity DVT.    COMPARISON: No prior venous lower extremity sonographic evaluations   available for comparison.    TECHNIQUE: Duplex sonography of the BILATERAL LOWER extremity veins with   color and spectral Doppler, with and without compression.    FINDINGS:    RIGHT:  Acute DVT is identified within the right common femoral vein, femoral   vein, popliteal vein as well as visualized segments of the posterior   tibial vein and peroneal vein.    LEFT:  Normal compressibility of the LEFT common femoral, femoral and popliteal   veins.  Doppler examination shows normal spontaneous and phasic flow.  No LEFT calf vein thrombosis is detected.    IMPRESSION:  Extensive acute DVT right lower extremity. No evidence for left lower   extremity DVT.    Findings were discussed with Dr. Cadena 3/16/2022 7:24 PM by Dr. Tsai   with read back confirmation.    --- End of Report ---            FRANCOIS TSAI MD; Attending Radiologist  This document has been electronically signed. Mar 16 2022  7:24PM    < end of copied text >  < from: CT Angio Chest PE Protocol w/ IV Cont (03.16.22 @ 17:36) >    ACC: [id number] EXAM:  CT ANGIO CHEST PULM ART [facility]                          PROCEDURE DATE:  03/16/2022          INTERPRETATION:  CLINICAL INFORMATION: Pleuritic right posterior chest   pain. Recent femoral fracture, status post mobilization.    COMPARISON: None.    CONTRAST/COMPLICATIONS:  IV Contrast: Omnipaque 350  90 cc administered   10 cc discarded  Oral Contrast: NONE  Complications: None reported at time of study completion    PROCEDURE:  CT Angiography of the Chest was performed followed by portal venous phase   imaging of the Abdomen and Pelvis.  Sagittal and coronal reformats were performed as well as 3D (MIP)   reconstructions.    FINDINGS:  CHEST:  LUNGS AND LARGE AIRWAYS: Patent central airways. There is a wedge-shaped   region of patchy groundglass density in the right lower lobe posteriorly,   compatible with mild infarct. There is mild bibasilar atelectasis.  PLEURA: No pleural effusion. No pneumothorax or pneumomediastinum.  VESSELS: There is a branching tubular filling defect extending from the   right lower lobe pulmonary artery into the superior and posterior basilar   segmental branches.  No additional filling defect is identified in the   first, second, or third order branches of the pulmonary arteries. The   pulmonary trunk and main pulmonary arteries are unremarkable. The   thoracic aorta and great vessels are unremarkable. There is no evidence   of straightening of the intraventricular septum.  HEART: Heart size is normal. No pericardial effusion.  MEDIASTINUM AND ANUM: No lymphadenopathy.  CHEST WALL AND LOWER NECK: Within normal limits.    ABDOMEN AND PELVIS:  LIVER: There is a subtle 4 cm rounded region of early arterial   enhancement in the right lobe laterally, likely related to local   perfusion abnormality. Otherwise, within normal limits.  BILE DUCTS: Normal caliber.  GALLBLADDER: Contracted.  SPLEEN: Within normal limits.  PANCREAS: Within normal limits.  ADRENALS: Within normal limits.  KIDNEYS/URETERS: Within normal limits.    BLADDER: Within normal limits.  REPRODUCTIVE ORGANS: Uterus and adnexa within normal limits.    BOWEL: No bowel obstruction. Appendix is normal.  PERITONEUM: No ascites.  VESSELS: Within normal limits. The IVC and pelvic veins opacify   unremarkably.  RETROPERITONEUM/LYMPH NODES: No lymphadenopathy.  ABDOMINAL WALL: Within normal limits.  BONES: Within normal limits.    IMPRESSION: Right lower lobe pulmonary embolism with small infarct.   Findings communicated to Dr. Donis at time of interpretation.    --- End of Report ---            SYLVIE FLORES MD; Attending Radiologist  This document has been electronically signed. Mar 16 2022  5:48PM    < end of copied text >  < from: CT Abdomen and Pelvis w/ IV Cont (03.16.22 @ 17:36) >  ACC: [id number] EXAM:  CT ABDOMEN AND PELVIS IC                          PROCEDURE DATE:  03/16/2022          INTERPRETATION:  CLINICAL INFORMATION: Pleuritic right posterior chest   pain. Recent femoral fracture, status post mobilization.    COMPARISON: None.    CONTRAST/COMPLICATIONS:  IV Contrast: Omnipaque 350  90 cc administered   10 cc discarded  Oral Contrast: NONE  Complications: None reported at time of study completion    PROCEDURE:  CT Angiography of the Chest was performed followed byportal venous phase   imaging of the Abdomen and Pelvis.  Sagittal and coronal reformats were performed as well as 3D (MIP)   reconstructions.    FINDINGS:  CHEST:  LUNGS AND LARGE AIRWAYS: Patent central airways. There is a wedge-shaped   region of patchy groundglass density in the right lower lobe posteriorly,   compatible with mild infarct. There is mild bibasilar atelectasis.  PLEURA: No pleural effusion. No pneumothorax or pneumomediastinum.  VESSELS: There is a branching tubular filling defect extending from the   right lower lobe pulmonary artery into the superior and posterior basilar   segmental branches.  No additional filling defect is identified in the   first, second, or third order branches of the pulmonary arteries. The   pulmonary trunk and main pulmonary arteries are unremarkable. The   thoracic aorta and great vessels are unremarkable. There is no evidence   of straightening of the intraventricular septum.  HEART: Heart size is normal. No pericardial effusion.  MEDIASTINUM AND ANUM: No lymphadenopathy.  CHEST WALL AND LOWER NECK: Within normal limits.    ABDOMEN AND PELVIS:  LIVER: There is a subtle 4 cm rounded region of early arterial   enhancement in the right lobe laterally, likely related to local   perfusion abnormality. Otherwise, within normal limits.  BILE DUCTS: Normal caliber.  GALLBLADDER: Contracted.  SPLEEN: Within normal limits.  PANCREAS: Within normal limits.  ADRENALS: Within normal limits.  KIDNEYS/URETERS: Within normal limits.    BLADDER: Within normal limits.  REPRODUCTIVE ORGANS: Uterus and adnexa within normal limits.    BOWEL: No bowel obstruction. Appendix is normal.  PERITONEUM: No ascites.  VESSELS: Within normal limits. The IVC and pelvic veins opacify   unremarkably.  RETROPERITONEUM/LYMPH NODES: No lymphadenopathy.  ABDOMINAL WALL: Within normal limits.  BONES: Within normal limits.    IMPRESSION: Right lower lobe pulmonary embolism with small infarct.   Findings communicated to Dr. Donis at time of interpretation.    --- End of Report ---            SYLVIE FLORES MD; Attending Radiologist  This document has been electronically signed. Mar 16 2022  6:07PM    < end of copied text >      
VASCULAR SURGERY CONSULT      This is a 57 y/o Female with no significant PMHx who in fractured her right proximal tibia in december and has been in a knee immobilizer since. Yesterday she presented to the ED with RUQ pain and a fever (101.0) and was found to have a right lower lobe pulmonary embolism. She was then found to have an extensive RLE DVT involving the common femoral, femoral, popliteal posterior tibial and peroneal veins. She denies pain of the right leg from the hip to the toes, denies swelling, color changes of the skin, changes in sensation or changes in the ability to move the extremity. Denies a history of blood clots. Reports that over the last week she has been more sedentary than usual, stating that her father returned from the hospital so she has been sitting with him since he's been home.         PMHx: seasonal allergies, GERD?  SURGICAL Hx: denies  SOCIAL Hx: denies  ALLERGIES: No Known Allergies        REVIEW OF SYSTEMS:   Head: denies headaches, dizziness & lightheadedness  Eyes: denies changes in vision, eye pain, double vision & eye discharge  Ears: denies changes in hearing & ear discharge  Nose: denies rhinorrhea  Mouth: denies bleeding gums & sore tongue & sore throat  Neck: denies swollen lymph nodes   Respiratory: denies SOB, cough, sputum production, wheezing  Cardiac: denies CP & irregular heart beat  Abdominal: denies abdominal pain, change in bowel movements  : denies dysuria, frequent urination, hematuria  Muscloskeletal: denies joint pain & muscle pain  Neuro: denies involuntary muscle movements  Psych: AAO x 3, no depression, no anxiety      MEDICATIONS:  ALPRAZolam 0.5 milliGRAM(s) Oral at bedtime PRN  apixaban 10 milliGRAM(s) Oral every 12 hours  cholecalciferol 2000 Unit(s) Oral two times a day  guaiFENesin  milliGRAM(s) Oral every 12 hours PRN  influenza   Vaccine 0.5 milliLiter(s) IntraMuscular once  pantoprazole    Tablet 40 milliGRAM(s) Oral two times a day        VITAL SIGNS:  T(C): 36.7 (03-17-22 @ 12:23), Max: 36.8 (03-16-22 @ 21:53)  HR: 96 (03-17-22 @ 12:23) (93 - 105)  BP: 132/86 (03-17-22 @ 12:23) (124/82 - 142/69)  RR: 20 (03-17-22 @ 12:23) (20 - 20)  SpO2: 90% (03-17-22 @ 12:23) (90% - 98%)      LABS:                           11.4   10.56 )-----------( 291      ( 17 Mar 2022 07:39 )             35.1   03-17    142  |  109<H>  |  12  ----------------------------<  98  3.7   |  26  |  0.68    Ca    9.8      17 Mar 2022 07:39    TPro  7.3  /  Alb  3.3  /  TBili  0.3  /  DBili  x   /  AST  12<L>  /  ALT  23  /  AlkPhos  71  03-17            IMAGING:   CT Angio Chest PE Protocol w/ IV Cont (03.16.22 @ 17:36)    CT Angiography of the Chest was performed followed by portal venous phase   imaging of the Abdomen and Pelvis.  Sagittal and coronal reformats were performed as well as 3D (MIP)   reconstructions.    FINDINGS:  CHEST:  LUNGS AND LARGE AIRWAYS: Patent central airways. There is a wedge-shaped   region of patchy groundglass density in the right lower lobe posteriorly,   compatible with mild infarct. There is mild bibasilar atelectasis.  PLEURA: No pleural effusion. No pneumothorax or pneumomediastinum.  VESSELS: There is a branching tubular filling defect extending from the   right lower lobe pulmonary artery into the superior and posterior basilar   segmental branches.  No additional filling defect is identified in the   first, second, or third order branches of the pulmonary arteries. The   pulmonary trunk and main pulmonary arteries are unremarkable. The   thoracic aorta and great vessels are unremarkable. There is no evidence   of straightening of the intraventricular septum.  HEART: Heart size is normal. No pericardial effusion.  MEDIASTINUM AND ANUM: No lymphadenopathy.  CHEST WALL AND LOWER NECK: Within normal limits.    ABDOMEN AND PELVIS:  LIVER: There is a subtle 4 cm rounded region of early arterial   enhancement in the right lobe laterally, likely related to local   perfusion abnormality. Otherwise, within normal limits.  BILE DUCTS: Normal caliber.  GALLBLADDER: Contracted.  SPLEEN: Within normal limits.  PANCREAS: Within normal limits.  ADRENALS: Within normal limits.  KIDNEYS/URETERS: Within normal limits.    BLADDER: Within normal limits.  REPRODUCTIVE ORGANS: Uterus and adnexa within normal limits.    BOWEL: No bowel obstruction. Appendix is normal.  PERITONEUM: No ascites.  VESSELS: Within normal limits. The IVC and pelvic veins opacify   unremarkably.  RETROPERITONEUM/LYMPH NODES: No lymphadenopathy.  ABDOMINAL WALL: Within normal limits.  BONES: Within normal limits.    IMPRESSION: Right lower lobe pulmonary embolism with small infarct.         US Duplex Venous Lower Ext Complete, Bilateral (03.16.22 @ 19:01)  FINDINGS:    RIGHT:  Acute DVT is identified within the right common femoral vein, femoral   vein, popliteal vein as well as visualized segments of the posterior   tibial vein and peroneal vein.    LEFT:  Normal compressibility of the LEFT common femoral, femoral and popliteal   veins.  Doppler examination shows normal spontaneous and phasic flow.  No LEFT calf vein thrombosis is detected.    IMPRESSION:  Extensive acute DVT right lower extremity. No evidence for left lower   extremity DVT.          PHYSICAL EXAM:  General: No acute distress, alert, cooperative, well developed, well groomed, well nourished, not cachetic   Skin: no jaundice, moist, normal texture, good skin turgor  Head: Normocephalic  Eyes: PERRL, EOMI, vision grossly intact  Neck: supple, No JVD, No lymphadenopathy  Pulmonary: good respiratory effort, no accessory muscle use, no intercostal retractions, clear to auscultation without any wheezes, rales or rhonchi, trachea is midline  Cardiac: normal S1 & S2, normal rate & rhythm, no murmurs appreciated, no peripheral edema, cyanosis or pallor. Extremities are warm & well perfused  Abdomen: soft, non tender, non-distended, no rebound, no guarding, +bowel sounds  Extremities: no gross joint deformities or abnormalities, no edema bilaterally, peripheral pulses (including radial, DP & PT) intact, no varicosities. No pain in the RLE from the hip to the toes. ROM is intact (5/5 strength), sensation intact, no deficit and equal when compared to the left leg.   Psych: AAO x 3, not anxious, normal affect

## 2022-03-18 ENCOUNTER — TRANSCRIPTION ENCOUNTER (OUTPATIENT)
Age: 57
End: 2022-03-18

## 2022-03-18 VITALS
SYSTOLIC BLOOD PRESSURE: 124 MMHG | RESPIRATION RATE: 19 BRPM | OXYGEN SATURATION: 94 % | DIASTOLIC BLOOD PRESSURE: 75 MMHG | HEART RATE: 104 BPM | TEMPERATURE: 98 F

## 2022-03-18 PROCEDURE — 84484 ASSAY OF TROPONIN QUANT: CPT

## 2022-03-18 PROCEDURE — 81001 URINALYSIS AUTO W/SCOPE: CPT

## 2022-03-18 PROCEDURE — 36415 COLL VENOUS BLD VENIPUNCTURE: CPT

## 2022-03-18 PROCEDURE — 87040 BLOOD CULTURE FOR BACTERIA: CPT

## 2022-03-18 PROCEDURE — 74177 CT ABD & PELVIS W/CONTRAST: CPT | Mod: MA

## 2022-03-18 PROCEDURE — 87637 SARSCOV2&INF A&B&RSV AMP PRB: CPT

## 2022-03-18 PROCEDURE — 85379 FIBRIN DEGRADATION QUANT: CPT

## 2022-03-18 PROCEDURE — 86146 BETA-2 GLYCOPROTEIN ANTIBODY: CPT

## 2022-03-18 PROCEDURE — 84443 ASSAY THYROID STIM HORMONE: CPT

## 2022-03-18 PROCEDURE — 99231 SBSQ HOSP IP/OBS SF/LOW 25: CPT

## 2022-03-18 PROCEDURE — 83880 ASSAY OF NATRIURETIC PEPTIDE: CPT

## 2022-03-18 PROCEDURE — 99285 EMERGENCY DEPT VISIT HI MDM: CPT

## 2022-03-18 PROCEDURE — 80061 LIPID PANEL: CPT

## 2022-03-18 PROCEDURE — 83090 ASSAY OF HOMOCYSTEINE: CPT

## 2022-03-18 PROCEDURE — 93970 EXTREMITY STUDY: CPT

## 2022-03-18 PROCEDURE — 83036 HEMOGLOBIN GLYCOSYLATED A1C: CPT

## 2022-03-18 PROCEDURE — 93005 ELECTROCARDIOGRAM TRACING: CPT

## 2022-03-18 PROCEDURE — C8929: CPT

## 2022-03-18 PROCEDURE — 86147 CARDIOLIPIN ANTIBODY EA IG: CPT

## 2022-03-18 PROCEDURE — 93306 TTE W/DOPPLER COMPLETE: CPT

## 2022-03-18 PROCEDURE — 85610 PROTHROMBIN TIME: CPT

## 2022-03-18 PROCEDURE — 83605 ASSAY OF LACTIC ACID: CPT

## 2022-03-18 PROCEDURE — 81025 URINE PREGNANCY TEST: CPT

## 2022-03-18 PROCEDURE — 85730 THROMBOPLASTIN TIME PARTIAL: CPT

## 2022-03-18 PROCEDURE — 71275 CT ANGIOGRAPHY CHEST: CPT | Mod: MA

## 2022-03-18 PROCEDURE — 87086 URINE CULTURE/COLONY COUNT: CPT

## 2022-03-18 PROCEDURE — 71045 X-RAY EXAM CHEST 1 VIEW: CPT

## 2022-03-18 PROCEDURE — 82607 VITAMIN B-12: CPT

## 2022-03-18 PROCEDURE — 85025 COMPLETE CBC W/AUTO DIFF WBC: CPT

## 2022-03-18 PROCEDURE — 80053 COMPREHEN METABOLIC PANEL: CPT

## 2022-03-18 PROCEDURE — 96374 THER/PROPH/DIAG INJ IV PUSH: CPT

## 2022-03-18 PROCEDURE — 84145 PROCALCITONIN (PCT): CPT

## 2022-03-18 RX ORDER — APIXABAN 2.5 MG/1
2 TABLET, FILM COATED ORAL
Qty: 0 | Refills: 0 | DISCHARGE
Start: 2022-03-18

## 2022-03-18 RX ORDER — CIMETIDINE 200 MG
1 TABLET ORAL
Qty: 0 | Refills: 0 | DISCHARGE

## 2022-03-18 RX ORDER — LEVOCETIRIZINE DIHYDROCHLORIDE 0.5 MG/ML
1 SOLUTION ORAL
Qty: 0 | Refills: 0 | DISCHARGE

## 2022-03-18 RX ORDER — CHOLECALCIFEROL (VITAMIN D3) 125 MCG
2000 CAPSULE ORAL
Qty: 0 | Refills: 0 | DISCHARGE
Start: 2022-03-18

## 2022-03-18 RX ORDER — ASPIRIN/CALCIUM CARB/MAGNESIUM 324 MG
1 TABLET ORAL
Qty: 0 | Refills: 0 | DISCHARGE

## 2022-03-18 RX ADMIN — PANTOPRAZOLE SODIUM 40 MILLIGRAM(S): 20 TABLET, DELAYED RELEASE ORAL at 05:16

## 2022-03-18 RX ADMIN — APIXABAN 10 MILLIGRAM(S): 2.5 TABLET, FILM COATED ORAL at 05:16

## 2022-03-18 RX ADMIN — Medication 2000 UNIT(S): at 05:16

## 2022-03-18 NOTE — DISCHARGE NOTE PROVIDER - NSDCMRMEDTOKEN_GEN_ALL_CORE_FT
aspirin 81 mg oral tablet: 1 tab(s) orally once a day  cimetidine 300 mg oral tablet: 1 tab(s) orally once a day, As Needed  Xyzal 5 mg oral tablet: 1 tab(s) orally once a day (in the evening), As Needed   apixaban 5 mg oral tablet: 2 tab(s) orally every 12 hours for 10 days then 5mg bid,,sent to pharmacy last night  cholecalciferol oral tablet: 2000 unit(s) orally 2 times a day

## 2022-03-18 NOTE — DISCHARGE NOTE PROVIDER - HOSPITAL COURSE
patient came due to abd/flank pain  she was found to have right PE with extensive right lower ext dvt  given lovenox in the Er  then bid on the floor  proceeded to eliquis,,seen by pulmonary cardiology hematology and vascular  no pulmonary sx,,,,denies pain rle  ct venogram pending,,,if not extended into pelvis will dc home  continue eliquis fu with pulmonary and hematology  eliquis was sent to pharmacy,last night

## 2022-03-18 NOTE — PROGRESS NOTE ADULT - SUBJECTIVE AND OBJECTIVE BOX
[facility] Cardiology    CHIEF COMPLAINT: Patient is a 56y old  Female who presents with a chief complaint of right flank/uq pain with temp 101,last night (18 Mar 2022 08:03)      Follow Up: [ ] Chest Pain      [ ] Dyspnea     [ ] Palpitations    [ ] Atrial Fibrillation     [ ] Ventricular Dysrhythmia    [ ] Abnormal EKG                      [ ] Abnormal Cardiac Enzymes     [ ] Valvular Disease    HPI:  patient came to see me today,,,,had right tib fx ,dec  with immobilzer to the ext,,,,none surgical  she was not on any anticoagulation  i had not seen the patient for well over a year  stated she had ruq/flank pain with temp last night,,,sent her to the ER  states she was walking less,working from home and watching her dad (16 Mar 2022 19:14)    PAST MEDICAL & SURGICAL HISTORY:  No pertinent past medical history  seasonal allergies  reflux    No significant past surgical history      MEDICATIONS  (STANDING):  apixaban 10 milliGRAM(s) Oral every 12 hours  cholecalciferol 2000 Unit(s) Oral two times a day  influenza   Vaccine 0.5 milliLiter(s) IntraMuscular once  pantoprazole    Tablet 40 milliGRAM(s) Oral two times a day    MEDICATIONS  (PRN):  ALPRAZolam 0.5 milliGRAM(s) Oral at bedtime PRN for sleep  guaiFENesin  milliGRAM(s) Oral every 12 hours PRN Cough    Allergies    No Known Allergies    Intolerances        REVIEW OF SYSTEMS:    CONSTITUTIONAL: No weakness, fevers or chills.   EYES/ENT: No visual changes;    NECK: No pain or stiffness  RESPIRATORY: No cough, wheezing, No shortness of breath  CARDIOVASCULAR: No chest pain or palpitations  GASTROINTESTINAL: No abdominal pain, or hematochezia.  GENITOURINARY: No dysuria orhematuria  NEUROLOGICAL: No numbness or weakness  SKIN: No itching, burning, rashes  All other review of systems is negative unless indicated above    Vital Signs Last 24 Hrs  T(C): 37.1 (18 Mar 2022 04:41), Max: 37.1 (18 Mar 2022 04:41)  T(F): 98.7 (18 Mar 2022 04:41), Max: 98.7 (18 Mar 2022 04:41)  HR: 78 (18 Mar 2022 04:41) (78 - 99)  BP: 112/80 (18 Mar 2022 04:41) (107/69 - 132/86)  BP(mean): --  RR: 18 (18 Mar 2022 04:41) (18 - 20)  SpO2: 93% (18 Mar 2022 04:41) (90% - 93%)  I&O's Summary      PHYSICAL EXAM:  Constitutional: NAD  Neurological: Alert, no focal deficits  HEENT: no JVD, EOMI  Cardiovascular: Regular, S1 and S2, no murmur  Pulmonary: breath sounds bilaterally  Gastrointestinal: Bowel Sounds present, soft, nontender  EXT:  no peripheral edema  Skin: No rashes.  Psych:  Mood calm  LABS: All Labs Reviewed:                          11.4   10.56 )-----------( 291      ( 17 Mar 2022 07:39 )             35.1     03-17    142  |  109<H>  |  12  ----------------------------<  98  3.7   |  26  |  0.68    Ca    9.8      17 Mar 2022 07:39    TPro  7.3  /  Alb  3.3  /  TBili  0.3  /  DBili  x   /  AST  12<L>  /  ALT  23  /  AlkPhos  71  03-17    PT/INR - ( 17 Mar 2022 07:39 )   PT: 14.6 sec;   INR: 1.25 ratio         PTT - ( 16 Mar 2022 14:00 )  PTT:30.9 sec      12 Lead ECG:   Ventricular Rate 112 BPM    Atrial Rate 112 BPM    P-R Interval 120 ms    QRS Duration 82 ms    Q-T Interval 330 ms    QTC Calculation(Bazett) 450 ms    P Axis 41 degrees    R Axis 10 degrees    T Axis 8 degrees    Diagnosis Line Sinus tachycardia  Otherwise normal ECG  Confirmed by JEMMA WASHINGTON ([id number]) on 3/17/2022 2:02:16 PM (03-17-22 @ 09:29)  12 Lead ECG:   Ventricular Rate 88 BPM    Atrial Rate 88 BPM    P-R Interval 126 ms    QRS Duration 76 ms    Q-T Interval 366 ms    QTC Calculation(Bazett) 442 ms    P Axis 41 degrees    R Axis 20 degrees    T Axis 14 degrees    Diagnosis Line Normal sinus rhythm  Normal ECG  Confirmed by JEMMA WASHINGTON ([id number]) on 3/17/2022 2:02:13 PM (03-16-22 @ 19:23)  Xray Chest 1 View-PORTABLE IMMEDIATE:   ACC: [id number] EXAM:  XR CHEST PORTABLE IMMED 1V                          PROCEDURE DATE:  03/16/2022          INTERPRETATION:  Portable chest radiograph    CLINICAL INFORMATION: Sepsis    TECHNIQUE:  Portable  AP chest radiograph.    COMPARISON: None. .    FINDINGS:  CATHETERS AND TUBES: None    PULMONARY: LEFT lung base linear discoid atelectasis.  Remaining visualized lung parenchyma clear. Posterior lung bases cannot   be assessed on AP view..   No pneumothorax.    HEART/VASCULAR: The heart and mediastinum size and configuration are   within normal limits.    BONES: Visualized osseous structures are intact.    IMPRESSION:   LEFT lung base linear discoid atelectasis. Posterior lung   bases cannot be assessed on single AP view.    Lateral or bilateral decubitus radiographs recommended to exclude   posterior lung base pathology..    --- End of Report ---    56 F a/w pleuritic CP, pulmonary embolism  sinus tach  ELIQUIS  echo pre-keys, Normal EF 60%  pt can f/u with me outpt [phone] for risk stratification            RENZO MONTERO MD; Attending Radiologist  This document has been electronically signed. Mar 16 2022  9:12PM (03-16-22 @ 14:23)

## 2022-03-18 NOTE — PROGRESS NOTE ADULT - SUBJECTIVE AND OBJECTIVE BOX
GREER AGUILAR    [facility] [address] D[address]    Allergies    No Known Allergies    Intolerances        PAST MEDICAL & SURGICAL HISTORY:  No pertinent past medical history  seasonal allergies  reflux    No significant past surgical history        FAMILY HISTORY:      Home Medications:  aspirin 81 mg oral tablet: 1 tab(s) orally once a day (17 Mar 2022 14:06)  cimetidine 300 mg oral tablet: 1 tab(s) orally once a day, As Needed (17 Mar 2022 14:06)  Xyzal 5 mg oral tablet: 1 tab(s) orally once a day (in the evening), As Needed (17 Mar 2022 14:06)      MEDICATIONS  (STANDING):  apixaban 10 milliGRAM(s) Oral every 12 hours  cholecalciferol 2000 Unit(s) Oral two times a day  influenza   Vaccine 0.5 milliLiter(s) IntraMuscular once  pantoprazole    Tablet 40 milliGRAM(s) Oral two times a day    MEDICATIONS  (PRN):  ALPRAZolam 0.5 milliGRAM(s) Oral at bedtime PRN for sleep  guaiFENesin  milliGRAM(s) Oral every 12 hours PRN Cough      Diet, Regular (22 @ 19:26) [Active]          Vital Signs Last 24 Hrs  T(C): 36.8 (17 Mar 2022 19:44), Max: 36.8 (17 Mar 2022 08:25)  T(F): 98.2 (17 Mar 2022 19:44), Max: 98.3 (17 Mar 2022 08:25)  HR: 99 (17 Mar 2022 19:44) (96 - 105)  BP: 107/69 (17 Mar 2022 19:44) (107/69 - 132/86)  BP(mean): --  RR: 18 (17 Mar 2022 19:44) (18 - 20)  SpO2: 90% (17 Mar 2022 19:44) (90% - 90%)              LABS:                        11.4   10.56 )-----------( 291      ( 17 Mar 2022 07:39 )             35.1     -    142  |  109<H>  |  12  ----------------------------<  98  3.7   |  26  |  0.68    Ca    9.8      17 Mar 2022 07:39    TPro  7.3  /  Alb  3.3  /  TBili  0.3  /  DBili  x   /  AST  12<L>  /  ALT  23  /  AlkPhos  71      PT/INR - ( 17 Mar 2022 07:39 )   PT: 14.6 sec;   INR: 1.25 ratio         PTT - ( 16 Mar 2022 14:00 )  PTT:30.9 sec  Urinalysis Basic - ( 16 Mar 2022 14:00 )    Color: Yellow / Appearance: Slightly Turbid / S.025 / pH: x  Gluc: x / Ketone: Negative  / Bili: Negative / Urobili: Negative   Blood: x / Protein: 30 mg/dL / Nitrite: Negative   Leuk Esterase: Small / RBC: 0-2 /HPF / WBC 0-2   Sq Epi: x / Non Sq Epi: Occasional / Bacteria: Occasional            WBC:  WBC Count: 10.56 K/uL ( @ 07:39)  WBC Count: 11.63 K/uL ( @ 14:00)      MICROBIOLOGY:  RECENT CULTURES:   .Blood Blood-Peripheral XXXX XXXX   No growth to date.     Clean Catch Clean Catch (Midstream) XXXX XXXX   <10,000 CFU/mL Normal Urogenital Naty                PT/INR - ( 17 Mar 2022 07:39 )   PT: 14.6 sec;   INR: 1.25 ratio         PTT - ( 16 Mar 2022 14:00 )  PTT:30.9 sec    Sodium:  Sodium, Serum: 142 mmol/L ( @ 07:39)  Sodium, Serum: 140 mmol/L ( @ 14:00)      0.68 mg/dL  @ 07:39  0.86 mg/dL  @ 14:00      Hemoglobin:  Hemoglobin: 11.4 g/dL ( @ 07:39)  Hemoglobin: 13.2 g/dL ( @ 14:00)      Platelets: Platelet Count - Automated: 291 K/uL ( @ 07:39)  Platelet Count - Automated: 337 K/uL ( @ 14:00)      LIVER FUNCTIONS - ( 17 Mar 2022 07:39 )  Alb: 3.3 g/dL / Pro: 7.3 g/dL / ALK PHOS: 71 U/L / ALT: 23 U/L / AST: 12 U/L / GGT: x             Urinalysis Basic - ( 16 Mar 2022 14:00 )    Color: Yellow / Appearance: Slightly Turbid / S.025 / pH: x  Gluc: x / Ketone: Negative  / Bili: Negative / Urobili: Negative   Blood: x / Protein: 30 mg/dL / Nitrite: Negative   Leuk Esterase: Small / RBC: 0-2 /HPF / WBC 0-2   Sq Epi: x / Non Sq Epi: Occasional / Bacteria: Occasional        RADIOLOGY & ADDITIONAL STUDIES:      MICROBIOLOGY:  RECENT CULTURES:   .Blood Blood-Peripheral XXXX XXXX   No growth to date.     Clean Catch Clean Catch (Midstream) XXXX XXXX   <10,000 CFU/mL Normal Urogenital Naty

## 2022-03-18 NOTE — DISCHARGE NOTE PROVIDER - PROVIDER TOKENS
No PROVIDER:[TOKEN:[[id number]],FOLLOWUP:[1 week]],PROVIDER:[TOKEN:[[id number]],FOLLOWUP:[1 week]]

## 2022-03-18 NOTE — DISCHARGE NOTE PROVIDER - NSDCCPCAREPLAN_GEN_ALL_CORE_FT
PRINCIPAL DISCHARGE DIAGNOSIS  Diagnosis: Pulmonary embolism  Assessment and Plan of Treatment: with extensive dvt right,,right lower lobe PE  post right tibial fracture,at some point  surgery was december  patient has been sedentary with no anticoagulation,as out patient  seen by vascular  pending ct venogram abd pelvis ro extension into pelvis  otherwise continue eliquis 10mg bid 10 days then 5mg bid  fu with hematology and pulmonary  asx  no chest pain  no shortness of breath  no leg pain  no worsening edema  its barley edematous

## 2022-03-18 NOTE — PROGRESS NOTE ADULT - ASSESSMENT
LAUREN BUTTERFIELD 3/16/2022 56 f 1965 DR KAJAL TIERNEY     REVIEW OF SYMPTOMS      Able to give ROS  Yes     RELIABLE +/-   CONSTITUTIONAL Weakness Yes  Chills No   ENDOCRINE  No heat or cold intolerance    ALLERGY No hives  Sore throat No stridor  RESP Coughing blood no  Shortness of breath YES   NEURO No Headache  Confusion Pain neck No   CARDIAC No Chest pain No Palpitations   GI  Pain abdomen NO   Vomiting NO     PHYSICAL EXAM    HEENT Unremarkable  atraumatic   RESP Fair air entry EXP prolonged    Harsh breath sound Resp distres mild   CARDIAC S1 S2 No S3     NO JVD    ABDOMEN SOFT BS PRESENT NOT DISTENDED No hepatosplenomegaly PEDAL EDEMA present No calf tenderness  NO rash       DOA/CC/PROBLEMS poa .  3/16/2022  R fl.ank pain radiates r side abd x 2 d   fever 3/.15      PMH-PSH .  pmh HO tibia fx  in immobil.izer x several weeks   pmh     COVID/ICU/CODE STATUS.                       COVID  STATUS.           ICU STAY. none  GOC.  3/16/2022 full code    BEST PRACTICE ISSUES.                                                  HEAD OF BED ELEVATION. Yes  DVT PROPHYLAXIS.      3/17/2022 apixaban 10.2 nonmassive pe      PROBLEM DATA/PLAN.    HEMODYNAMICS.   Monitor bp Target MAP 65 (+)    RESP.   Monitor po Target po 90-95%      PMH/PSH PROBLEMS.  Management continued/modified as indicated    OXYGEN REQUIREMENTS.  3/17/2022 ra 90%    INFECTION  w 3/16-3/17/2022 w 11.6-10  pr 3/17/2022 (-)  ua 3/16/2022 w 0-2   flu ab 3/16/2022 (-)  rsv 3/16/2022 (-)   infection unlikely    LACTICEMIA  la 3/16-3/17/2022 la 3.6 - .8   IV fluids    VENOUS THROMBOEMBOLISM  D-dimr 3/16/2022 dd 537   Cr 3/16/2022 Cr 8  pregnancy prof 3/16/2022 (-)   tr 3/17/2022 (-)   bnp 3/17/2022 73  v duplx  3/17/2022 extensive ac dvt rle   cta ch abd pelvis 3/16/2022 ct   wedge shaped patchy gg density rll post cw mild infarct   Mild basal atelectasis   rll pulm artery filling defect   AR  transient major risk factor for   pe (immobility, tibia fx)   Nonmassive pe  spesi score 0   No reason to consider tpa   3/16/2022 6:36 PM pt already given lovenox 70   3/16/2022 Cont lovenox 702   3/16/2022 check echo and v duplx   3/16/2022 needs minimum 3 m anticoagn   3/17/2022 agree to change to doac and dc 3/18    TIME SPENT   Over 25 minutes aggregate care time spent on encounter; activities included   direct patient care, counseling and/or coordinating care reviewing notes, lab data/ imaging , discussion with multidisciplinary team/ patient  /family and explaining in detail risks, benefits, alternatives  of the recommendations     LAUREN BUTTERFIELD 3/16/2022 56 f 1965 DR KAJAL TIERNEY

## 2022-03-18 NOTE — DISCHARGE NOTE NURSING/CASE MANAGEMENT/SOCIAL WORK - NSDCPEFALRISK_GEN_ALL_CORE
For information on Fall & Injury Prevention, visit: [email or web address] OR  [email or web address] OR  https://www.cdc.gov/steadi/patient.html

## 2022-03-18 NOTE — DISCHARGE NOTE PROVIDER - CARE PROVIDER_API CALL
Nadya Steele (DO)  Medicine  [address]  [place] [address]  Phone: [phone]  Fax: [phone]  Follow Up Time: 1 week    Arjun Puga)  Hematology; Internal Medicine; Medical Oncology  [address], [address]  [place] [address]  Phone: [phone]  Fax: [phone]  Follow Up Time: 1 week

## 2022-03-18 NOTE — PROGRESS NOTE ADULT - SUBJECTIVE AND OBJECTIVE BOX
patient seen this AM, no overnight events , no complaint sat this time , heart rate oe 117 was mentioned  by the nurse in the room although patient denies any cp, sob, palpitation.    T(F): 98.2 (03-18-22 @ 11:47), Max: 98.7 (03-18-22 @ 04:41)  HR: 104 (03-18-22 @ 11:47) (78 - 104)  BP: 124/75 (03-18-22 @ 11:47) (107/69 - 124/75)  RR: 19 (03-18-22 @ 11:47) (18 - 19)  SpO2: 94% (03-18-22 @ 11:47) (90% - 94%)  Wt(kg): --  CAPILLARY BLOOD GLUCOSE          PHYSICAL EXAM:  General: NAD, WDWN.   Neuro:  Alert & oriented x 3  CV: +S1+S2 regular rate and rhythm, tachy to 110  Lung: respirations nonlabored, good inspiratory effort  Abdomen: soft, NT ND. Normactive BS  Extremities: no pedal edema or calf tenderness noted       LABS:                        11.4   10.56 )-----------( 291      ( 17 Mar 2022 07:39 )             35.1     03-17    142  |  109<H>  |  12  ----------------------------<  98  3.7   |  26  |  0.68    Ca    9.8      17 Mar 2022 07:39    TPro  7.3  /  Alb  3.3  /  TBili  0.3  /  DBili  x   /  AST  12<L>  /  ALT  23  /  AlkPhos  71  03-17    PT/INR - ( 17 Mar 2022 07:39 )   PT: 14.6 sec;   INR: 1.25 ratio         PTT - ( 16 Mar 2022 14:00 )  PTT:30.9 sec  I&O's Detail      Impression: 56y Female admitted with Other pulmonary embolism without acute cor pulmonale      PMH No pertinent past medical history        Plan:  care as per primary team/cardiology  patient scheduled for echo today, pressure remains wnl  no vascular intervention at this time  will discuss with attending         patient seen this AM, no overnight events , no complaints at this time,  patient denies any cp, sob, palpitation.    T(F): 98.2 (03-18-22 @ 11:47), Max: 98.7 (03-18-22 @ 04:41)  HR: 104 (03-18-22 @ 11:47) (78 - 104)  BP: 124/75 (03-18-22 @ 11:47) (107/69 - 124/75)  RR: 19 (03-18-22 @ 11:47) (18 - 19)  SpO2: 94% (03-18-22 @ 11:47) (90% - 94%)  Wt(kg): --  CAPILLARY BLOOD GLUCOSE          PHYSICAL EXAM:  General: NAD, WDWN.   Neuro:  Alert & oriented x 3  CV: +S1+S2 regular rate and rhythm,   Lung: respirations nonlabored, good inspiratory effort  Abdomen: soft, NT ND. Normactive BS  Extremities: no pedal edema or calf tenderness noted, no calf tenderness      LABS:                        11.4   10.56 )-----------( 291      ( 17 Mar 2022 07:39 )             35.1     03-17    142  |  109<H>  |  12  ----------------------------<  98  3.7   |  26  |  0.68    Ca    9.8      17 Mar 2022 07:39    TPro  7.3  /  Alb  3.3  /  TBili  0.3  /  DBili  x   /  AST  12<L>  /  ALT  23  /  AlkPhos  71  03-17    PT/INR - ( 17 Mar 2022 07:39 )   PT: 14.6 sec;   INR: 1.25 ratio         PTT - ( 16 Mar 2022 14:00 )  PTT:30.9 sec  I&O's Detail      Impression: 56y Female admitted with Other pulmonary embolism without acute cor pulmonale      PMH No pertinent past medical history        Plan:  care as per primary team/  awaiting Ct venogram   will discuss with attending

## 2022-03-18 NOTE — DISCHARGE NOTE NURSING/CASE MANAGEMENT/SOCIAL WORK - PATIENT PORTAL LINK FT
You can access the FollowMyHealth Patient Portal offered by [facility] by registering at the following website: [email or web address]. By joining [facility]’s FollowMyHealth portal, you will also be able to view your health information using other applications (apps) compatible with our system.

## 2022-03-18 NOTE — PROGRESS NOTE ADULT - REASON FOR ADMISSION
right flank/uq pain with temp 101,last night

## 2022-03-19 LAB — CARDIOLIPIN AB SER-ACNC: POSITIVE

## 2022-03-21 LAB — B2 GLYCOPROT1 AB SER QL: NEGATIVE — SIGNIFICANT CHANGE UP

## 2022-03-22 LAB
CARDIOLIPIN IGM SER-MCNC: 15.6 GPL — HIGH (ref 0–12.5)
CARDIOLIPIN IGM SER-MCNC: 6.5 MPL — SIGNIFICANT CHANGE UP (ref 0–12.5)
CULTURE RESULTS: SIGNIFICANT CHANGE UP
CULTURE RESULTS: SIGNIFICANT CHANGE UP
DEPRECATED CARDIOLIPIN IGA SER: <5 APL — SIGNIFICANT CHANGE UP (ref 0–12.5)
SPECIMEN SOURCE: SIGNIFICANT CHANGE UP
SPECIMEN SOURCE: SIGNIFICANT CHANGE UP

## 2022-03-24 PROBLEM — Z00.00 ENCOUNTER FOR PREVENTIVE HEALTH EXAMINATION: Status: ACTIVE | Noted: 2022-03-24

## 2022-03-28 ENCOUNTER — APPOINTMENT (OUTPATIENT)
Dept: PULMONOLOGY | Facility: CLINIC | Age: 57
End: 2022-03-28

## 2022-03-30 ENCOUNTER — TRANSCRIPTION ENCOUNTER (OUTPATIENT)
Age: 57
End: 2022-03-30

## 2022-03-30 ENCOUNTER — APPOINTMENT (OUTPATIENT)
Dept: PULMONOLOGY | Facility: CLINIC | Age: 57
End: 2022-03-30
Payer: COMMERCIAL

## 2022-03-30 DIAGNOSIS — Z82.49 FAMILY HISTORY OF ISCHEMIC HEART DISEASE AND OTHER DISEASES OF THE CIRCULATORY SYSTEM: ICD-10-CM

## 2022-03-30 PROCEDURE — 99204 OFFICE O/P NEW MOD 45 MIN: CPT

## 2022-03-30 NOTE — PROCEDURE
[FreeTextEntry1] : \par ACC: [id number] EXAM: CT ANGIO CHEST PULM ART WAWIC\par \par PROCEDURE DATE: 03/16/2022\par \par \par \par INTERPRETATION: CLINICAL INFORMATION: Pleuritic right posterior chest pain. Recent femoral fracture, status post mobilization.\par \par COMPARISON: None.\par \par CONTRAST/COMPLICATIONS:\par IV Contrast: Omnipaque 350 90 cc administered 10 cc discarded\par Oral Contrast: NONE\par Complications: None reported at time of study completion\par \par PROCEDURE:\par CT Angiography of the Chest was performed followed by portal venous phase imaging of the Abdomen and Pelvis.\par Sagittal and coronal reformats were performed as well as 3D (MIP) reconstructions.\par \par FINDINGS:\par CHEST:\par LUNGS AND LARGE AIRWAYS: Patent central airways. There is a wedge-shaped region of patchy groundglass density in the right lower lobe posteriorly, compatible with mild infarct. There is mild bibasilar atelectasis.\par PLEURA: No pleural effusion. No pneumothorax or pneumomediastinum.\par VESSELS: There is a branching tubular filling defect extending from the right lower lobe pulmonary artery into the superior and posterior basilar segmental branches. No additional filling defect is identified in the first, second, or third order branches of the pulmonary arteries. The pulmonary trunk and main pulmonary arteries are unremarkable. The thoracic aorta and great vessels are unremarkable. There is no evidence of straightening of the intraventricular septum.\par HEART: Heart size is normal. No pericardial effusion.\par MEDIASTINUM AND ANUM: No lymphadenopathy.\par CHEST WALL AND LOWER NECK: Within normal limits.\par \par ABDOMEN AND PELVIS:\par LIVER: There is a subtle 4 cm rounded region of early arterial enhancement in the right lobe laterally, likely related to local perfusion abnormality. Otherwise, within normal limits.\par BILE DUCTS: Normal caliber.\par GALLBLADDER: Contracted.\par SPLEEN: Within normal limits.\par PANCREAS: Within normal limits.\par ADRENALS: Within normal limits.\par KIDNEYS/URETERS: Within normal limits.\par \par BLADDER: Within normal limits.\par REPRODUCTIVE ORGANS: Uterus and adnexa within normal limits.\par \par BOWEL: No bowel obstruction. Appendix is normal.\par PERITONEUM: No ascites.\par VESSELS: Within normal limits. The IVC and pelvic veins opacify unremarkably.\par RETROPERITONEUM/LYMPH NODES: No lymphadenopathy.\par ABDOMINAL WALL: Within normal limits.\par BONES: Within normal limits.\par \par IMPRESSION: Right lower lobe pulmonary embolism with small infarct. Findings communicated to Dr. Donis at time of interpretation.\par \par --- End of Report ---\par \par \par \par \par \par SYLVIE FLORES MD; Attending Radiologist\par This document has been electronically signed. Mar 16 2022 5:48PM\par \par \par \par    \par  \par  \par \par \par Bookmarks \par   \par    \par \par  \par \par  \par \par  ACC: [id number] EXAM: US DPLX LWR EXT VEINS COMPL BI\par \par PROCEDURE DATE: 03/16/2022\par \par \par \par INTERPRETATION: CLINICAL INFORMATION: Recent pulmonary embolism. Evaluate for lower extremity DVT.\par \par COMPARISON: No prior venous lower extremity sonographic evaluations available for comparison.\par \par TECHNIQUE: Duplex sonography of the BILATERAL LOWER extremity veins with color and spectral Doppler, with and without compression.\par \par FINDINGS:\par \par RIGHT:\par Acute DVT is identified within the right common femoral vein, femoral vein, popliteal vein as well as visualized segments of the posterior tibial vein and peroneal vein.\par \par LEFT:\par Normal compressibility of the LEFT common femoral, femoral and popliteal veins.\par Doppler examination shows normal spontaneous and phasic flow.\par No LEFT calf vein thrombosis is detected.\par \par IMPRESSION:\par Extensive acute DVT right lower extremity. No evidence for left lower extremity DVT.\par \par Findings were discussed with Dr. Cadena 3/16/2022 7:24 PM by Dr. Leblanc with read back confirmation.\par \par --- End of Report ---\par \par \par ACC: [id number] EXAM: US DPLX LWR EXT VEINS COMPL BI\par \par PROCEDURE DATE: 03/16/2022\par \par \par \par INTERPRETATION: CLINICAL INFORMATION: Recent pulmonary embolism. Evaluate for lower extremity DVT.\par \par COMPARISON: No prior venous lower extremity sonographic evaluations available for comparison.\par \par TECHNIQUE: Duplex sonography of the BILATERAL LOWER extremity veins with color and spectral Doppler, with and without compression.\par \par FINDINGS:\par \par RIGHT:\par Acute DVT is identified within the right common femoral vein, femoral vein, popliteal vein as well as visualized segments of the posterior tibial vein and peroneal vein.\par \par LEFT:\par Normal compressibility of the LEFT common femoral, femoral and popliteal veins.\par Doppler examination shows normal spontaneous and phasic flow.\par No LEFT calf vein thrombosis is detected.\par \par IMPRESSION:\par Extensive acute DVT right lower extremity. No evidence for left lower extremity DVT.\par \par Findings were discussed with Dr. Cadena 3/16/2022 7:24 PM by Dr. Leblanc with read back confirmation.\par \par --- End of Report ---\par \par \par \par Labs noted for D-dimer of 537 anticardiolipin IgG positive beta glycoprotein antibody negative\par \par

## 2022-03-30 NOTE — ASSESSMENT
[FreeTextEntry1] : Pulmonary embolism likely related to recent orthopedic immobilization.  Note family history of pulmonary embolism and DVT.  This would suggest a hypercoagulable state.  The significance of the anticardiolipin antibodies in the acute phase of pulmonary embolism is uncertain to my knowledge of her mother's case her mother never had anticardiolipin antibodies.  If these antibodies are persistent need to likely extend anticoagulation also need to discuss optimal anticoagulant as DOAC not considered preferred agent and presence of anticardiolipin antibodies if they are real await input from  hematology.  Will test Covid antibodies to see if there is any recent COVID infection which may have also triggered a transient hypercoagulability.\par \par Check D-dimer as guide to adequacy of current treatment plan minimum of 6 months treatment for now

## 2022-03-30 NOTE — HISTORY OF PRESENT ILLNESS
[Never] : never [TextBox_4] : Patient presents for initial evaluation of pulmonary embolism.  Had tibial fracture and was in a walking cast.  Developed flank pain work-up revealed evidence of pulmonary embolism and DVT.  Subsequent in-hospital work-up showed positive anticardiolipin antibodies.  Treated initially with low molecular weight heparin injection and then transitioned to Eliquis.  Was initially on 5 mg twice daily and then after 1 week increase to 10 mg twice daily.  Feels better flank pain resolved some shortness of breath.  Mother has history of recurrent pulmonary embolism-of interest is that mother's pulmonary embolism was diagnosed also after orthopedic type immobilization.  Was on anticoagulation for extended period of time and recurred after anticoagulation was discontinued exact etiology of mother's hypercoagulable state unclear.  No recent other travel or immobility.  No recent COVID infection the patient's knowledge no prior DVT or pulmonary embolism\par \par \par

## 2022-03-31 ENCOUNTER — TRANSCRIPTION ENCOUNTER (OUTPATIENT)
Age: 57
End: 2022-03-31

## 2022-03-31 LAB
BASOPHILS # BLD AUTO: 0.05 K/UL
BASOPHILS NFR BLD AUTO: 0.7 %
CARDIOLIPIN AB SER IA-ACNC: POSITIVE
COVID-19 NUCLEOCAPSID  GAM ANTIBODY INTERPRETATION: NEGATIVE
COVID-19 SPIKE DOMAIN ANTIBODY INTERPRETATION: POSITIVE
DEPRECATED D DIMER PPP IA-ACNC: 218 NG/ML DDU
EOSINOPHIL # BLD AUTO: 0.09 K/UL
EOSINOPHIL NFR BLD AUTO: 1.3 %
HCT VFR BLD CALC: 41.2 %
HGB BLD-MCNC: 13 G/DL
IMM GRANULOCYTES NFR BLD AUTO: 0.1 %
LYMPHOCYTES # BLD AUTO: 2.6 K/UL
LYMPHOCYTES NFR BLD AUTO: 37.9 %
MAN DIFF?: NORMAL
MCHC RBC-ENTMCNC: 28.4 PG
MCHC RBC-ENTMCNC: 31.6 GM/DL
MCV RBC AUTO: 90.2 FL
MONOCYTES # BLD AUTO: 0.45 K/UL
MONOCYTES NFR BLD AUTO: 6.6 %
NEUTROPHILS # BLD AUTO: 3.66 K/UL
NEUTROPHILS NFR BLD AUTO: 53.4 %
PLATELET # BLD AUTO: 488 K/UL
RBC # BLD: 4.57 M/UL
RBC # FLD: 13.1 %
SARS-COV-2 AB SERPL IA-ACNC: >250 U/ML
SARS-COV-2 AB SERPL QL IA: 0.08 INDEX
WBC # FLD AUTO: 6.86 K/UL

## 2022-04-04 LAB — B2 GLYCOPROT1 IGG SER-ACNC: 10.6 SGU

## 2022-04-05 ENCOUNTER — APPOINTMENT (OUTPATIENT)
Dept: CARDIOLOGY | Facility: CLINIC | Age: 57
End: 2022-04-05
Payer: COMMERCIAL

## 2022-04-05 ENCOUNTER — NON-APPOINTMENT (OUTPATIENT)
Age: 57
End: 2022-04-05

## 2022-04-05 VITALS
OXYGEN SATURATION: 98 % | HEART RATE: 82 BPM | HEIGHT: 65 IN | SYSTOLIC BLOOD PRESSURE: 130 MMHG | DIASTOLIC BLOOD PRESSURE: 89 MMHG

## 2022-04-05 DIAGNOSIS — K21.9 GASTRO-ESOPHAGEAL REFLUX DISEASE W/OUT ESOPHAGITIS: ICD-10-CM

## 2022-04-05 PROCEDURE — 99204 OFFICE O/P NEW MOD 45 MIN: CPT

## 2022-04-05 PROCEDURE — 93000 ELECTROCARDIOGRAM COMPLETE: CPT

## 2022-04-05 RX ORDER — OMEPRAZOLE 20 MG/1
20 CAPSULE, DELAYED RELEASE ORAL
Qty: 90 | Refills: 3 | Status: ACTIVE | COMMUNITY
Start: 2022-04-05

## 2022-04-06 ENCOUNTER — TRANSCRIPTION ENCOUNTER (OUTPATIENT)
Age: 57
End: 2022-04-06

## 2022-04-06 LAB
B2 GLYCOPROT1 IGM SER-ACNC: 5.3 SMU
CARDIOLIPIN IGM SER-MCNC: 13.9 GPL
CARDIOLIPIN IGM SER-MCNC: 8.2 MPL

## 2022-04-07 LAB — B2 GLYCOPROT1 IGA SERPL IA-ACNC: 5.5 SAU

## 2022-05-04 ENCOUNTER — APPOINTMENT (OUTPATIENT)
Dept: PULMONOLOGY | Facility: CLINIC | Age: 57
End: 2022-05-04
Payer: COMMERCIAL

## 2022-05-04 VITALS
SYSTOLIC BLOOD PRESSURE: 150 MMHG | DIASTOLIC BLOOD PRESSURE: 89 MMHG | HEART RATE: 113 BPM | HEIGHT: 65 IN | OXYGEN SATURATION: 99 %

## 2022-05-04 PROCEDURE — 99214 OFFICE O/P EST MOD 30 MIN: CPT

## 2022-05-04 RX ORDER — ESZOPICLONE 2 MG/1
2 TABLET, FILM COATED ORAL
Qty: 30 | Refills: 0 | Status: ACTIVE | COMMUNITY
Start: 2022-05-04 | End: 1900-01-01

## 2022-05-04 NOTE — HISTORY OF PRESENT ILLNESS
[Never] : never [TextBox_4] : Prior: Patient presents for initial evaluation of pulmonary embolism.  Had tibial fracture and was in a walking cast.  Developed flank pain work-up revealed evidence of pulmonary embolism and DVT.  Subsequent in-hospital work-up showed positive anticardiolipin antibodies.  Treated initially with low molecular weight heparin injection and then transitioned to Eliquis.  Was initially on 5 mg twice daily and then after 1 week increase to 10 mg twice daily.  Feels better flank pain resolved some shortness of breath.  Mother has history of recurrent pulmonary embolism-of interest is that mother's pulmonary embolism was diagnosed also after orthopedic type immobilization.  Was on anticoagulation for extended period of time and recurred after anticoagulation was discontinued exact etiology of mother's hypercoagulable state unclear.  No recent other travel or immobility.  No recent COVID infection the patient's knowledge no prior DVT or pulmonary embolism\par \par Current: Overall feels better.  Less short of breath.  Saw hematologist.  Had follow-up anticardiolipin antibodies showing a lower positive level.\par \par \par

## 2022-05-04 NOTE — PROCEDURE
[FreeTextEntry1] : \par ACC: [id number] EXAM: CT ANGIO CHEST PULM ART WAWIC\par \par PROCEDURE DATE: 03/16/2022\par \par \par \par INTERPRETATION: CLINICAL INFORMATION: Pleuritic right posterior chest pain. Recent femoral fracture, status post mobilization.\par \par COMPARISON: None.\par \par CONTRAST/COMPLICATIONS:\par IV Contrast: Omnipaque 350 90 cc administered 10 cc discarded\par Oral Contrast: NONE\par Complications: None reported at time of study completion\par \par PROCEDURE:\par CT Angiography of the Chest was performed followed by portal venous phase imaging of the Abdomen and Pelvis.\par Sagittal and coronal reformats were performed as well as 3D (MIP) reconstructions.\par \par FINDINGS:\par CHEST:\par LUNGS AND LARGE AIRWAYS: Patent central airways. There is a wedge-shaped region of patchy groundglass density in the right lower lobe posteriorly, compatible with mild infarct. There is mild bibasilar atelectasis.\par PLEURA: No pleural effusion. No pneumothorax or pneumomediastinum.\par VESSELS: There is a branching tubular filling defect extending from the right lower lobe pulmonary artery into the superior and posterior basilar segmental branches. No additional filling defect is identified in the first, second, or third order branches of the pulmonary arteries. The pulmonary trunk and main pulmonary arteries are unremarkable. The thoracic aorta and great vessels are unremarkable. There is no evidence of straightening of the intraventricular septum.\par HEART: Heart size is normal. No pericardial effusion.\par MEDIASTINUM AND ANUM: No lymphadenopathy.\par CHEST WALL AND LOWER NECK: Within normal limits.\par \par ABDOMEN AND PELVIS:\par LIVER: There is a subtle 4 cm rounded region of early arterial enhancement in the right lobe laterally, likely related to local perfusion abnormality. Otherwise, within normal limits.\par BILE DUCTS: Normal caliber.\par GALLBLADDER: Contracted.\par SPLEEN: Within normal limits.\par PANCREAS: Within normal limits.\par ADRENALS: Within normal limits.\par KIDNEYS/URETERS: Within normal limits.\par \par BLADDER: Within normal limits.\par REPRODUCTIVE ORGANS: Uterus and adnexa within normal limits.\par \par BOWEL: No bowel obstruction. Appendix is normal.\par PERITONEUM: No ascites.\par VESSELS: Within normal limits. The IVC and pelvic veins opacify unremarkably.\par RETROPERITONEUM/LYMPH NODES: No lymphadenopathy.\par ABDOMINAL WALL: Within normal limits.\par BONES: Within normal limits.\par \par IMPRESSION: Right lower lobe pulmonary embolism with small infarct. Findings communicated to Dr. Donis at time of interpretation.\par \par --- End of Report ---\par \par \par \par \par \par SYLVIE FLORES MD; Attending Radiologist\par This document has been electronically signed. Mar 16 2022 5:48PM\par \par \par \par    \par  \par  \par \par \par Bookmarks \par   \par    \par \par  \par \par  \par \par  ACC: [id number] EXAM: US DPLX LWR EXT VEINS COMPL BI\par \par PROCEDURE DATE: 03/16/2022\par \par \par \par INTERPRETATION: CLINICAL INFORMATION: Recent pulmonary embolism. Evaluate for lower extremity DVT.\par \par COMPARISON: No prior venous lower extremity sonographic evaluations available for comparison.\par \par TECHNIQUE: Duplex sonography of the BILATERAL LOWER extremity veins with color and spectral Doppler, with and without compression.\par \par FINDINGS:\par \par RIGHT:\par Acute DVT is identified within the right common femoral vein, femoral vein, popliteal vein as well as visualized segments of the posterior tibial vein and peroneal vein.\par \par LEFT:\par Normal compressibility of the LEFT common femoral, femoral and popliteal veins.\par Doppler examination shows normal spontaneous and phasic flow.\par No LEFT calf vein thrombosis is detected.\par \par IMPRESSION:\par Extensive acute DVT right lower extremity. No evidence for left lower extremity DVT.\par \par Findings were discussed with Dr. Cadena 3/16/2022 7:24 PM by Dr. Leblanc with read back confirmation.\par \par --- End of Report ---\par \par \par ACC: [id number] EXAM: US DPLX LWR EXT VEINS COMPL BI\par \par PROCEDURE DATE: 03/16/2022\par \par \par \par INTERPRETATION: CLINICAL INFORMATION: Recent pulmonary embolism. Evaluate for lower extremity DVT.\par \par COMPARISON: No prior venous lower extremity sonographic evaluations available for comparison.\par \par TECHNIQUE: Duplex sonography of the BILATERAL LOWER extremity veins with color and spectral Doppler, with and without compression.\par \par FINDINGS:\par \par RIGHT:\par Acute DVT is identified within the right common femoral vein, femoral vein, popliteal vein as well as visualized segments of the posterior tibial vein and peroneal vein.\par \par LEFT:\par Normal compressibility of the LEFT common femoral, femoral and popliteal veins.\par Doppler examination shows normal spontaneous and phasic flow.\par No LEFT calf vein thrombosis is detected.\par \par IMPRESSION:\par Extensive acute DVT right lower extremity. No evidence for left lower extremity DVT.\par \par Findings were discussed with Dr. Cadena 3/16/2022 7:24 PM by Dr. Leblanc with read back confirmation.\par \par --- End of Report ---\par \par \par \par Labs noted for D-dimer of 537 anticardiolipin IgG positive beta glycoprotein antibody negative\par \par

## 2022-05-04 NOTE — ASSESSMENT
[FreeTextEntry1] : Pulmonary embolism likely related to recent orthopedic immobilization.  Anticardiolipin antibodies of questionable significance as they are not persistent and decreasing in titer.  D-dimer went from abnormal to normal on anticoagulation.  Recommend continued anticoagulation for 3 months and then reassess likely will require 6 months of anticoagulation with close clinical follow-up.\par \par Discussed need for repeat imaging recommend repeat CT scan prior to discontinuation of anticoagulation to assess for new baseline

## 2022-08-17 ENCOUNTER — APPOINTMENT (OUTPATIENT)
Dept: PULMONOLOGY | Facility: CLINIC | Age: 57
End: 2022-08-17

## 2022-08-17 VITALS
OXYGEN SATURATION: 96 % | HEART RATE: 75 BPM | WEIGHT: 168 LBS | SYSTOLIC BLOOD PRESSURE: 127 MMHG | DIASTOLIC BLOOD PRESSURE: 82 MMHG | HEIGHT: 65 IN | BODY MASS INDEX: 27.99 KG/M2

## 2022-08-17 PROCEDURE — 99213 OFFICE O/P EST LOW 20 MIN: CPT

## 2022-08-17 NOTE — ASSESSMENT
[FreeTextEntry1] : Pulmonary embolism had positive anticardiolipin antibodies would confirm negative status on repeat labs today if they are negative can discontinue anticoagulation after 6 months has upcoming air travel to [place] and I agree to maintain anticoagulation tissues completed this trip

## 2022-08-17 NOTE — HISTORY OF PRESENT ILLNESS
[TextBox_4] : Follow-up for pulmonary embolism and DVT feels well.  Found to have positive anticardiolipin antibodies saw hematologist on follow-up close antibodies apparently disappeared-confirmed on my review of the blood test from April.  She feels well she has some residual discomfort in her right ankle and some swelling she usually wears a compression stocking she is not wearing one today.

## 2022-08-17 NOTE — PHYSICAL EXAM
[No Acute Distress] : no acute distress [Normal Oropharynx] : normal oropharynx [Normal Appearance] : normal appearance [No Neck Mass] : no neck mass [Normal Rate/Rhythm] : normal rate/rhythm [Normal S1, S2] : normal s1, s2 [No Murmurs] : no murmurs [No Resp Distress] : no resp distress [Clear to Auscultation Bilaterally] : clear to auscultation bilaterally [No Abnormalities] : no abnormalities [Benign] : benign [Normal Gait] : normal gait [No Clubbing] : no clubbing [No Cyanosis] : no cyanosis [FROM] : FROM [Normal Color/ Pigmentation] : normal color/ pigmentation [No Focal Deficits] : no focal deficits [Oriented x3] : oriented x3 [Normal Affect] : normal affect [TextBox_105] : 1+ edema right lower extremity

## 2022-08-19 ENCOUNTER — TRANSCRIPTION ENCOUNTER (OUTPATIENT)
Age: 57
End: 2022-08-19

## 2022-08-19 LAB
CARDIOLIPIN IGM SER-MCNC: 7 GPL
CARDIOLIPIN IGM SER-MCNC: 9.8 MPL

## 2022-09-21 ENCOUNTER — APPOINTMENT (OUTPATIENT)
Dept: PULMONOLOGY | Facility: CLINIC | Age: 57
End: 2022-09-21

## 2022-09-21 VITALS
WEIGHT: 167 LBS | HEIGHT: 65 IN | HEART RATE: 80 BPM | DIASTOLIC BLOOD PRESSURE: 73 MMHG | SYSTOLIC BLOOD PRESSURE: 108 MMHG | OXYGEN SATURATION: 95 % | BODY MASS INDEX: 27.82 KG/M2

## 2022-09-21 PROCEDURE — 99213 OFFICE O/P EST LOW 20 MIN: CPT

## 2022-09-21 NOTE — ASSESSMENT
[FreeTextEntry1] : Pulmonary embolism had positive anticardiolipin antibodies now negative.  If these are negative and D-dimer is negative now can discontinue anticoagulation recommend repeat D-dimer 2 weeks after discontinuation.  Apparently she saw hematologist and there is no heritable form of hypercoagulability no ever given the history of her mother's pulmonary embolism which is a recurrent problem obviously there is a concern for pulmonary embolism over the long-term.\par \par On a separate note she has issues with ongoing insomnia.  Her mother has sleep apnea I suggest a home sleep study

## 2022-09-21 NOTE — HISTORY OF PRESENT ILLNESS
[TextBox_4] : Follow-up for pulmonary embolism and DVT feels well.  Found to have positive anticardiolipin antibodies saw hematologist on follow-up close antibodies apparently disappeared-confirmed on my review of the blood test from April.  She feels well\par She returned from an extensive plane trip no incident still on anticoagulation.\par \par Ongoing issue with insomnia.  Note family history of sleep apnea.  Patient reports having had a sleep study in the past that was negative.  Notes nonrestorative sleep daytime sleepiness

## 2022-09-21 NOTE — REASON FOR VISIT
[Follow-Up] : a follow-up visit [Pulmonary Embolism] : pulmonary embolism [TextBox_44] : Pulmonary embolism

## 2022-09-22 LAB — DEPRECATED D DIMER PPP IA-ACNC: <150 NG/ML DDU

## 2022-10-04 ENCOUNTER — APPOINTMENT (OUTPATIENT)
Dept: PULMONOLOGY | Facility: CLINIC | Age: 57
End: 2022-10-04

## 2022-10-04 PROCEDURE — 95800 SLP STDY UNATTENDED: CPT

## 2022-10-05 PROCEDURE — 95800 SLP STDY UNATTENDED: CPT

## 2022-10-26 ENCOUNTER — APPOINTMENT (OUTPATIENT)
Dept: PULMONOLOGY | Facility: CLINIC | Age: 57
End: 2022-10-26

## 2022-10-26 VITALS
BODY MASS INDEX: 27.82 KG/M2 | OXYGEN SATURATION: 96 % | DIASTOLIC BLOOD PRESSURE: 89 MMHG | WEIGHT: 167 LBS | HEART RATE: 82 BPM | SYSTOLIC BLOOD PRESSURE: 134 MMHG | HEIGHT: 65 IN

## 2022-10-26 DIAGNOSIS — G47.00 INSOMNIA, UNSPECIFIED: ICD-10-CM

## 2022-10-26 DIAGNOSIS — G47.33 OBSTRUCTIVE SLEEP APNEA (ADULT) (PEDIATRIC): ICD-10-CM

## 2022-10-26 PROCEDURE — 99213 OFFICE O/P EST LOW 20 MIN: CPT

## 2022-10-26 NOTE — HISTORY OF PRESENT ILLNESS
[TextBox_4] : Follow-up sleep evaluation.  Underwent home sleep study because of family history of sleep apnea and disrupted sleep.  Here to review result.  Still reports subjectively poor sleep every third night because she does not wish to take it more often than that she is concerned about becoming dependent on medication.

## 2022-10-26 NOTE — ASSESSMENT
[FreeTextEntry1] : Insomnia and mild sleep apnea.  Purpose of doing home sleep study was to rule out severe NIMCO given family history the home sleep study appears adequate for this she does have significant reduced sleep efficiency.  Recommend addition of Rozerem 8 mg nightly\par \par Do not recommend treatment of sleep apnea at this time as I do not think this will help her sleep problem

## 2022-12-15 ENCOUNTER — APPOINTMENT (OUTPATIENT)
Dept: CARDIOLOGY | Facility: CLINIC | Age: 57
End: 2022-12-15
Payer: COMMERCIAL

## 2022-12-15 VITALS
DIASTOLIC BLOOD PRESSURE: 88 MMHG | BODY MASS INDEX: 28.16 KG/M2 | SYSTOLIC BLOOD PRESSURE: 140 MMHG | WEIGHT: 169 LBS | HEART RATE: 87 BPM | OXYGEN SATURATION: 98 % | HEIGHT: 65 IN

## 2022-12-15 DIAGNOSIS — I26.99 OTHER PULMONARY EMBOLISM W/OUT ACUTE COR PULMONALE: ICD-10-CM

## 2022-12-15 DIAGNOSIS — R79.89 OTHER SPECIFIED ABNORMAL FINDINGS OF BLOOD CHEMISTRY: ICD-10-CM

## 2022-12-15 PROCEDURE — 99214 OFFICE O/P EST MOD 30 MIN: CPT

## 2022-12-15 RX ORDER — RAMELTEON 8 MG/1
8 TABLET ORAL
Qty: 30 | Refills: 1 | Status: DISCONTINUED | COMMUNITY
Start: 2022-10-26 | End: 2022-12-15

## 2022-12-15 RX ORDER — APIXABAN 5 MG/1
5 TABLET, FILM COATED ORAL
Qty: 60 | Refills: 0 | Status: COMPLETED | COMMUNITY
Start: 1900-01-01 | End: 2022-12-15

## 2022-12-15 NOTE — PHYSICAL EXAM
[General Appearance - Well Developed] : well developed [Normal Appearance] : normal appearance [Well Groomed] : well groomed [General Appearance - Well Nourished] : well nourished [No Deformities] : no deformities [General Appearance - In No Acute Distress] : no acute distress [Normal Conjunctiva] : the conjunctiva exhibited no abnormalities [Eyelids - No Xanthelasma] : the eyelids demonstrated no xanthelasmas [Normal Oral Mucosa] : normal oral mucosa [No Oral Pallor] : no oral pallor [No Oral Cyanosis] : no oral cyanosis [Normal Jugular Venous A Waves Present] : normal jugular venous A waves present [Normal Jugular Venous V Waves Present] : normal jugular venous V waves present [No Jugular Venous Eng A Waves] : no jugular venous eng A waves [Heart Rate And Rhythm] : heart rate and rhythm were normal [Heart Sounds] : normal S1 and S2 [Murmurs] : no murmurs present [Abdomen Soft] : soft [Abdomen Tenderness] : non-tender [Abdomen Mass (___ Cm)] : no abdominal mass palpated [Abnormal Walk] : normal gait [Gait - Sufficient For Exercise Testing] : the gait was sufficient for exercise testing [Nail Clubbing] : no clubbing of the fingernails [Cyanosis, Localized] : no localized cyanosis [Petechial Hemorrhages (___cm)] : no petechial hemorrhages [] : no ischemic changes

## 2022-12-15 NOTE — REASON FOR VISIT
[Consultation] : a consultation regarding [FreeTextEntry1] : 57F here to establish care\par \par Followed by Dr. Thomas\par Low titer of Cardiolipin IgG 13.9, on repeat was normal\par B2GP negative\par Dimer in Sept 2022 negative\par \par She is still going to PT\par She was on eliquis 5mg BID , now is on Eliquis 2.5mg BID - this was reduced recently by hematology.  \par \par Meds:\par Gabapentin\par Eliquis 2.5mg BID\par Trazadone\par Omeprazole\par \par Coloscopy - polyps 4 years ago, no cancer.\par Mammogram- normal\par Pap smear - ok \par \par Mother with history of DVT.  \par Hematology:  hon irwin\par \par Venos duplex 10/28/2022\par Chronic incompletely occlusive thrombus in the right pop and post tibitial vins\par \par \par Duplex 7/25/2022:  R FV, Pop and PT V \par \par 4/13/2022:  DVT mid and distal femoral veins and pop and PT V\par \par Prothrombin negative\par FVL negative\par \par \par \par Testing and hospital stay below:\par VESSELS: Within normal limits. The IVC and pelvic veins opacify unremarkably.\par RETROPERITONEUM/LYMPH NODES: No lymphadenopathy.\par ABDOMINAL WALL: Within normal limits.\par BONES: Within normal limits.\par \par IMPRESSION: Right lower lobe pulmonary embolism with small infarct. Findings communicated to Dr. Donis at time of interpretation.\par \par \par \par RIGHT:\par Acute DVT is identified within the right common femoral vein, femoral vein, popliteal vein as well as visualized segments of the posterior tibial vein and peroneal vein.\par \par LEFT:\par Normal compressibility of the LEFT common femoral, femoral and popliteal veins.\par Doppler examination shows normal spontaneous and phasic flow.\par No LEFT calf vein thrombosis is detected.\par \par IMPRESSION:\par Extensive acute DVT right lower extremity. No evidence for left lower extremity DVT.\par \par From hospital stay in Spring of 2022:\par \par This is a 57 y/o Female with no significant PMHx who in fractured her right\par proximal tibia in december and has been in a knee immobilizer since. Yesterday\par she presented to the ED with RUQ pain and a fever (101.0) and was found to have\par a right lower lobe pulmonary embolism. She was then found to have an extensive\par RLE DVT involving the common femoral, femoral, popliteal posterior tibial and\par peroneal veins. She denies pain of the right leg from the hip to the toes,\par denies swelling, color changes of the skin, changes in sensation or changes in\par the ability to move the extremity. Denies a history of blood clots. Reports\par that over the last week she has been more sedentary than usual, stating that\par her father returned from the hospital so she has been sitting with him since\par he's been home.

## 2022-12-15 NOTE — ASSESSMENT
[FreeTextEntry1] : Assessment:\par 1.  Fatty Liver - pending liver doc\par 2.  Provoked DVT /PE\par 3. Still with chronic changes on recent duplex\par 4.  Now on half dose a/c\par 5.  CT showed no PE Sept 29th\par \par \par Plan:\par 1.  I agree with management so far\par 2.  She should continue eliquis 2.5mg BID for now... given her chronic DVT on the right and also her mother's history of VTE. \par 3.  If she is to fly, she should take an additional eliquis 2.5mg 1 hour prior to flight\par 4.  Agree with repeat venous duplex later this month to assure stabiltiy\par 5.  In the future, we can consider cessation if DVT resolves, such as aspirin paried with DVT ppx for high risk situations. Not yet\par 6.  Return in 6 months \par

## 2023-02-16 ENCOUNTER — APPOINTMENT (OUTPATIENT)
Dept: HEPATOLOGY | Facility: CLINIC | Age: 58
End: 2023-02-16
Payer: COMMERCIAL

## 2023-02-16 VITALS
WEIGHT: 165 LBS | SYSTOLIC BLOOD PRESSURE: 134 MMHG | HEART RATE: 84 BPM | DIASTOLIC BLOOD PRESSURE: 88 MMHG | TEMPERATURE: 97.4 F | HEIGHT: 65 IN | BODY MASS INDEX: 27.49 KG/M2 | OXYGEN SATURATION: 99 % | RESPIRATION RATE: 14 BRPM

## 2023-02-16 PROCEDURE — 99204 OFFICE O/P NEW MOD 45 MIN: CPT

## 2023-02-17 LAB
A1AT SERPL-MCNC: 127 MG/DL
ALBUMIN SERPL ELPH-MCNC: 4.8 G/DL
ALP BLD-CCNC: 84 U/L
ALT SERPL-CCNC: 28 U/L
ANION GAP SERPL CALC-SCNC: 15 MMOL/L
AST SERPL-CCNC: 21 U/L
BASOPHILS # BLD AUTO: 0.07 K/UL
BASOPHILS NFR BLD AUTO: 1 %
BILIRUB SERPL-MCNC: <0.2 MG/DL
BUN SERPL-MCNC: 17 MG/DL
CALCIUM SERPL-MCNC: 10 MG/DL
CERULOPLASMIN SERPL-MCNC: 26 MG/DL
CHLORIDE SERPL-SCNC: 104 MMOL/L
CO2 SERPL-SCNC: 21 MMOL/L
CREAT SERPL-MCNC: 0.74 MG/DL
EGFR: 95 ML/MIN/1.73M2
EOSINOPHIL # BLD AUTO: 0.16 K/UL
EOSINOPHIL NFR BLD AUTO: 2.3 %
FERRITIN SERPL-MCNC: 143 NG/ML
GLUCOSE SERPL-MCNC: 96 MG/DL
HCT VFR BLD CALC: 42.1 %
HGB BLD-MCNC: 14.1 G/DL
IMM GRANULOCYTES NFR BLD AUTO: 0.3 %
INR PPP: 0.98 RATIO
IRON SATN MFR SERPL: 13 %
IRON SERPL-MCNC: 42 UG/DL
LYMPHOCYTES # BLD AUTO: 3.38 K/UL
LYMPHOCYTES NFR BLD AUTO: 47.8 %
MAN DIFF?: NORMAL
MCHC RBC-ENTMCNC: 29.7 PG
MCHC RBC-ENTMCNC: 33.5 GM/DL
MCV RBC AUTO: 88.6 FL
MONOCYTES # BLD AUTO: 0.48 K/UL
MONOCYTES NFR BLD AUTO: 6.8 %
NEUTROPHILS # BLD AUTO: 2.96 K/UL
NEUTROPHILS NFR BLD AUTO: 41.8 %
PLATELET # BLD AUTO: 334 K/UL
POTASSIUM SERPL-SCNC: 4.3 MMOL/L
PROT SERPL-MCNC: 7.3 G/DL
PT BLD: 11.4 SEC
RBC # BLD: 4.75 M/UL
RBC # FLD: 12.1 %
SODIUM SERPL-SCNC: 141 MMOL/L
TIBC SERPL-MCNC: 323 UG/DL
UIBC SERPL-MCNC: 281 UG/DL
WBC # FLD AUTO: 7.07 K/UL

## 2023-02-17 NOTE — HISTORY OF PRESENT ILLNESS
[FreeTextEntry1] : 57F with recently diagnosed Fatty liver and possible cirrhosis with an FNH\par MRI 11/2022 [facility] with Fatty Liver hepatomegaly FNH 4,2 portal HTN with varices and biliary hamartomas\par No FHX of lvier disease\par No ETOH use\par No IVDU\par No smoking\par No hx of autoimmune liver disease\par Hx of EBV\par +15-20 lbs weight gain over 10 months\par She recently was diagnosed with DVT and PE - completed anticoagulation\par Takes milk thistle\par No herbals\par \par She has no complaints currently\par She denies any f/c/n/v/cp/sob\par She denies any GI bleeding\par No confusion\par No abd distention\par No LE Edema

## 2023-02-17 NOTE — ASSESSMENT
[FreeTextEntry1] : 57F with incidentally discovered fatty liver\par This is likely related to weight gain after her DVT \par \par Counseled on natural hx of fatty liver disease\par Counseled on diet - limiting carbs and increasing protein and vegetable intake\par 30min exercise daily\par Goal 7-10% weight loss\par Discussed concern of disease progression to MAXWELL and more advanced fibrosis as well\par \par Incidentally there is evidence of cirrhosis on imaging with portal HTN - likely MAXWELL but would recheck studies for autoimmune hepatitis, viral hepatitis and other genetic diseases\par \par Discussed natural history of cirrhosis with patient\par Discussed salt restriction and abstinence from ETOH\par Stressed importance of close follow up - including regular interval labs and screening for HCC\par \par Will verify with Fibroscan - if there is discrepancy - will consider MRE or biopsy\par \par #FNH\par - benign and not malignant\par - will be getting routine imaging in future\par \par \par

## 2023-02-17 NOTE — PHYSICAL EXAM
[General Appearance - Alert] : alert [General Appearance - In No Acute Distress] : in no acute distress [Sclera] : the sclera and conjunctiva were normal [PERRL With Normal Accommodation] : pupils were equal in size, round, and reactive to light [Extraocular Movements] : extraocular movements were intact [Oropharynx] : the oropharynx was normal [Outer Ear] : the ears and nose were normal in appearance [Neck Appearance] : the appearance of the neck was normal [Neck Cervical Mass (___cm)] : no neck mass was observed [Jugular Venous Distention Increased] : there was no jugular-venous distention [Thyroid Diffuse Enlargement] : the thyroid was not enlarged [Thyroid Nodule] : there were no palpable thyroid nodules [Auscultation Breath Sounds / Voice Sounds] : lungs were clear to auscultation bilaterally [Heart Rate And Rhythm] : heart rate was normal and rhythm regular [Heart Sounds] : normal S1 and S2 [Heart Sounds Gallop] : no gallops [Murmurs] : no murmurs [Heart Sounds Pericardial Friction Rub] : no pericardial rub [Bowel Sounds] : normal bowel sounds [Abdomen Soft] : soft [Abdomen Tenderness] : non-tender [Abdomen Mass (___ Cm)] : no abdominal mass palpated [Abnormal Walk] : normal gait [Nail Clubbing] : no clubbing  or cyanosis of the fingernails [Musculoskeletal - Swelling] : no joint swelling seen [Motor Tone] : muscle strength and tone were normal [Skin Color & Pigmentation] : normal skin color and pigmentation [Skin Turgor] : normal skin turgor [] : no rash [Deep Tendon Reflexes (DTR)] : deep tendon reflexes were 2+ and symmetric [Sensation] : the sensory exam was normal to light touch and pinprick [No Focal Deficits] : no focal deficits [Oriented To Time, Place, And Person] : oriented to person, place, and time [Impaired Insight] : insight and judgment were intact [Affect] : the affect was normal

## 2023-02-21 LAB
DEPRECATED KAPPA LC FREE/LAMBDA SER: 1.4 RATIO
IGA SER QL IEP: 306 MG/DL
IGG SER QL IEP: 1126 MG/DL
IGM SER QL IEP: 70 MG/DL
KAPPA LC CSF-MCNC: 1.32 MG/DL
KAPPA LC SERPL-MCNC: 1.85 MG/DL

## 2023-02-22 LAB — SMOOTH MUSCLE AB SER QL IF: NORMAL

## 2023-03-03 ENCOUNTER — APPOINTMENT (OUTPATIENT)
Dept: HEPATOLOGY | Facility: CLINIC | Age: 58
End: 2023-03-03
Payer: COMMERCIAL

## 2023-03-03 PROCEDURE — 76981 USE PARENCHYMA: CPT

## 2023-03-07 ENCOUNTER — EMERGENCY (EMERGENCY)
Facility: HOSPITAL | Age: 58
LOS: 1 days | Discharge: HOME | End: 2023-03-07
Attending: EMERGENCY MEDICINE | Admitting: EMERGENCY MEDICINE
Payer: COMMERCIAL

## 2023-03-07 VITALS
DIASTOLIC BLOOD PRESSURE: 86 MMHG | RESPIRATION RATE: 17 BRPM | SYSTOLIC BLOOD PRESSURE: 146 MMHG | HEART RATE: 94 BPM | TEMPERATURE: 98 F | OXYGEN SATURATION: 96 %

## 2023-03-07 VITALS
TEMPERATURE: 98 F | RESPIRATION RATE: 18 BRPM | OXYGEN SATURATION: 97 % | SYSTOLIC BLOOD PRESSURE: 136 MMHG | DIASTOLIC BLOOD PRESSURE: 87 MMHG | HEART RATE: 99 BPM

## 2023-03-07 LAB
ALBUMIN SERPL ELPH-MCNC: 3.6 G/DL — SIGNIFICANT CHANGE UP (ref 3.3–5)
ALP SERPL-CCNC: 75 U/L — SIGNIFICANT CHANGE UP (ref 40–120)
ALT FLD-CCNC: 34 U/L — SIGNIFICANT CHANGE UP (ref 12–78)
ANION GAP SERPL CALC-SCNC: 5 MMOL/L — SIGNIFICANT CHANGE UP (ref 5–17)
APTT BLD: 35.4 SEC — SIGNIFICANT CHANGE UP (ref 27.5–35.5)
AST SERPL-CCNC: 11 U/L — LOW (ref 15–37)
BASOPHILS # BLD AUTO: 0.05 K/UL — SIGNIFICANT CHANGE UP (ref 0–0.2)
BASOPHILS NFR BLD AUTO: 0.5 % — SIGNIFICANT CHANGE UP (ref 0–2)
BILIRUB SERPL-MCNC: 0.3 MG/DL — SIGNIFICANT CHANGE UP (ref 0.2–1.2)
BUN SERPL-MCNC: 19 MG/DL — SIGNIFICANT CHANGE UP (ref 7–23)
CALCIUM SERPL-MCNC: 9.1 MG/DL — SIGNIFICANT CHANGE UP (ref 8.5–10.1)
CHLORIDE SERPL-SCNC: 106 MMOL/L — SIGNIFICANT CHANGE UP (ref 96–108)
CK MB CFR SERPL CALC: <1 NG/ML — SIGNIFICANT CHANGE UP (ref 0–3.6)
CO2 SERPL-SCNC: 28 MMOL/L — SIGNIFICANT CHANGE UP (ref 22–31)
CREAT SERPL-MCNC: 0.73 MG/DL — SIGNIFICANT CHANGE UP (ref 0.5–1.3)
D DIMER BLD IA.RAPID-MCNC: <150 NG/ML DDU — SIGNIFICANT CHANGE UP
EGFR: 96 ML/MIN/1.73M2 — SIGNIFICANT CHANGE UP
EOSINOPHIL # BLD AUTO: 0.21 K/UL — SIGNIFICANT CHANGE UP (ref 0–0.5)
EOSINOPHIL NFR BLD AUTO: 2.1 % — SIGNIFICANT CHANGE UP (ref 0–6)
GLUCOSE SERPL-MCNC: 98 MG/DL — SIGNIFICANT CHANGE UP (ref 70–99)
HCG SERPL-ACNC: <1 MIU/ML — SIGNIFICANT CHANGE UP
HCT VFR BLD CALC: 41.6 % — SIGNIFICANT CHANGE UP (ref 34.5–45)
HGB BLD-MCNC: 13.5 G/DL — SIGNIFICANT CHANGE UP (ref 11.5–15.5)
IMM GRANULOCYTES NFR BLD AUTO: 0.2 % — SIGNIFICANT CHANGE UP (ref 0–0.9)
INR BLD: 1.19 RATIO — HIGH (ref 0.88–1.16)
LIDOCAIN IGE QN: 163 U/L — SIGNIFICANT CHANGE UP (ref 73–393)
LYMPHOCYTES # BLD AUTO: 4.59 K/UL — HIGH (ref 1–3.3)
LYMPHOCYTES # BLD AUTO: 46.9 % — HIGH (ref 13–44)
MAGNESIUM SERPL-MCNC: 2.1 MG/DL — SIGNIFICANT CHANGE UP (ref 1.6–2.6)
MCHC RBC-ENTMCNC: 28.6 PG — SIGNIFICANT CHANGE UP (ref 27–34)
MCHC RBC-ENTMCNC: 32.5 GM/DL — SIGNIFICANT CHANGE UP (ref 32–36)
MCV RBC AUTO: 88.1 FL — SIGNIFICANT CHANGE UP (ref 80–100)
MONOCYTES # BLD AUTO: 0.73 K/UL — SIGNIFICANT CHANGE UP (ref 0–0.9)
MONOCYTES NFR BLD AUTO: 7.5 % — SIGNIFICANT CHANGE UP (ref 2–14)
NEUTROPHILS # BLD AUTO: 4.19 K/UL — SIGNIFICANT CHANGE UP (ref 1.8–7.4)
NEUTROPHILS NFR BLD AUTO: 42.8 % — LOW (ref 43–77)
NRBC # BLD: 0 /100 WBCS — SIGNIFICANT CHANGE UP (ref 0–0)
PLATELET # BLD AUTO: 343 K/UL — SIGNIFICANT CHANGE UP (ref 150–400)
POTASSIUM SERPL-MCNC: 3.9 MMOL/L — SIGNIFICANT CHANGE UP (ref 3.5–5.3)
POTASSIUM SERPL-SCNC: 3.9 MMOL/L — SIGNIFICANT CHANGE UP (ref 3.5–5.3)
PROT SERPL-MCNC: 7.3 G/DL — SIGNIFICANT CHANGE UP (ref 6–8.3)
PROTHROM AB SERPL-ACNC: 13.9 SEC — HIGH (ref 10.5–13.4)
RBC # BLD: 4.72 M/UL — SIGNIFICANT CHANGE UP (ref 3.8–5.2)
RBC # FLD: 12.1 % — SIGNIFICANT CHANGE UP (ref 10.3–14.5)
SODIUM SERPL-SCNC: 139 MMOL/L — SIGNIFICANT CHANGE UP (ref 135–145)
TROPONIN I, HIGH SENSITIVITY RESULT: 4.2 NG/L — SIGNIFICANT CHANGE UP
WBC # BLD: 9.79 K/UL — SIGNIFICANT CHANGE UP (ref 3.8–10.5)
WBC # FLD AUTO: 9.79 K/UL — SIGNIFICANT CHANGE UP (ref 3.8–10.5)

## 2023-03-07 PROCEDURE — 85379 FIBRIN DEGRADATION QUANT: CPT

## 2023-03-07 PROCEDURE — 99285 EMERGENCY DEPT VISIT HI MDM: CPT

## 2023-03-07 PROCEDURE — 36415 COLL VENOUS BLD VENIPUNCTURE: CPT

## 2023-03-07 PROCEDURE — 83735 ASSAY OF MAGNESIUM: CPT

## 2023-03-07 PROCEDURE — 85610 PROTHROMBIN TIME: CPT

## 2023-03-07 PROCEDURE — 93005 ELECTROCARDIOGRAM TRACING: CPT

## 2023-03-07 PROCEDURE — 85730 THROMBOPLASTIN TIME PARTIAL: CPT

## 2023-03-07 PROCEDURE — 93971 EXTREMITY STUDY: CPT

## 2023-03-07 PROCEDURE — 82553 CREATINE MB FRACTION: CPT

## 2023-03-07 PROCEDURE — 93971 EXTREMITY STUDY: CPT | Mod: 26,RT

## 2023-03-07 PROCEDURE — 83690 ASSAY OF LIPASE: CPT

## 2023-03-07 PROCEDURE — 85025 COMPLETE CBC W/AUTO DIFF WBC: CPT

## 2023-03-07 PROCEDURE — 71275 CT ANGIOGRAPHY CHEST: CPT | Mod: 26,MA

## 2023-03-07 PROCEDURE — 84484 ASSAY OF TROPONIN QUANT: CPT

## 2023-03-07 PROCEDURE — 99285 EMERGENCY DEPT VISIT HI MDM: CPT | Mod: 25

## 2023-03-07 PROCEDURE — 71275 CT ANGIOGRAPHY CHEST: CPT | Mod: MA

## 2023-03-07 PROCEDURE — 84702 CHORIONIC GONADOTROPIN TEST: CPT

## 2023-03-07 PROCEDURE — 93010 ELECTROCARDIOGRAM REPORT: CPT

## 2023-03-07 PROCEDURE — 80053 COMPREHEN METABOLIC PANEL: CPT

## 2023-03-07 RX ORDER — GABAPENTIN 400 MG/1
300 CAPSULE ORAL ONCE
Refills: 0 | Status: COMPLETED | OUTPATIENT
Start: 2023-03-07 | End: 2023-03-07

## 2023-03-07 RX ORDER — LIDOCAINE 4 G/100G
1 CREAM TOPICAL ONCE
Refills: 0 | Status: COMPLETED | OUTPATIENT
Start: 2023-03-07 | End: 2023-03-07

## 2023-03-07 RX ADMIN — GABAPENTIN 300 MILLIGRAM(S): 400 CAPSULE ORAL at 20:33

## 2023-03-07 RX ADMIN — LIDOCAINE 1 PATCH: 4 CREAM TOPICAL at 20:33

## 2023-03-07 NOTE — ED PROVIDER NOTE - CPE EDP MUSC NORM
02/25/19  12:01 PM    Patient given rx for losartan 100 mg every day #30 +2 and at her request RX marked as NOT [employer] which she believes may be a manufacterer recall and caused an odd smell when she took it.     Vandy Schilder, MD normal...

## 2023-03-07 NOTE — ED PROVIDER NOTE - CARE PROVIDER_API CALL
Nadya Steele (DO)  Medicine  [address] [facility]  [place] [address]  Phone: [phone]  Fax: [phone]  Follow Up Time:

## 2023-03-07 NOTE — ED PROVIDER NOTE - NSFOLLOWUPINSTRUCTIONS_ED_ALL_ED_FT
Flank pain is pain that is located on the side of the body between the upper abdomen and the spine. This area is called the flank. The pain may occur over a short period of time (acute), or it may be long-term or recurring (chronic). It may be mild or severe. Flank pain can be caused by many things, including:  •Muscle soreness or injury.      •Kidney infection, kidney stones, or kidney disease.      •Stress.      •A disease of the spine (vertebral disk disease).      •A lung infection (pneumonia).      •Fluid around the lungs (pulmonary edema).      •A skin rash caused by the chickenpox virus (shingles).      •Tumors that affect the back of the abdomen.      •Gallbladder disease.        Follow these instructions at home:  A comparison of three sample cups showing dark yellow, yellow, and pale yellow urine.   •Drink enough fluid to keep your urine pale yellow.      •Rest as told by your health care provider.      •Take over-the-counter and prescription medicines only as told by your health care provider.      •Keep a journal to track what has caused your flank pain and what has made it feel better.      •Keep all follow-up visits. This is important.        Contact a health care provider if:    •Your pain is not controlled with medicine.      •You have new symptoms.      •Your pain gets worse.      •Your symptoms last longer than 2–3 days.      •You have trouble urinating or you are urinating very frequently.        Get help right away if:    •You have trouble breathing or you are short of breath.      •Your abdomen hurts or it is swollen or red.      •You have nausea or vomiting.      •You feel faint, or you faint.      •You have blood in your urine.      •You have flank pain and a fever.      These symptoms may represent a serious problem that is an emergency. Do not wait to see if the symptoms will go away. Get medical help right away. Call your local emergency services (911 in the [place] Do not drive yourself to the hospital.       Summary    •Flank pain is pain that is located on the side of the body between the upper abdomen and the spine.      •The pain may occur over a short period of time (acute), or it may be long-term or recurring (chronic). It may be mild or severe.      •Flank pain can be caused by many things.      •Contact your health care provider if your symptoms get worse or last longer than 2–3 days.      This information is not intended to replace advice given to you by your health care provider. Make sure you discuss any questions you have with your health care provider.      Document Revised: 02/28/2022 Document Reviewed: 02/28/2022    Elsevier Patient Education © 2023 [employer]

## 2023-03-07 NOTE — ED ADULT NURSE NOTE - OBJECTIVE STATEMENT
Pt presents to the ED s/p right sided chest pain, right m id back pain. EKG done, pt placed on cardiac monitor continuous pulse ox.

## 2023-03-07 NOTE — ED ADULT TRIAGE NOTE - CHIEF COMPLAINT QUOTE
pt was sent by PMD Lucien Glover s/p chest and lower back pain since yesterday pt with HX of PE on Eliquist

## 2023-03-07 NOTE — ED PROVIDER NOTE - PATIENT PORTAL LINK FT
You can access the FollowMyHealth Patient Portal offered by [facility] by registering at the following website: [email or web address]. By joining [employer]’s FollowMyHealth portal, you will also be able to view your health information using other applications (apps) compatible with our system.

## 2023-03-07 NOTE — ED ADULT NURSE NOTE - NS TRANSFER PATIENT BELONGINGS
Cell Phone/PDA (specify)/Clothing white metal earring studs/Cell Phone/PDA (specify)/Jewelry/Money (specify)/Clothing

## 2023-03-07 NOTE — ED PROVIDER NOTE - CLINICAL SUMMARY MEDICAL DECISION MAKING FREE TEXT BOX
57-year-old female with right-sided chest pain, similar symptoms to last year when she was diagnosed with pulmonary embolism, will follow-up CT angio chest, complaining of right lower extremity discomfort, will follow-up ultrasound rule out DVT, send CBC CMP cardiac enzymes, EKG, pain control and will reevaluate.

## 2023-03-07 NOTE — ED PROVIDER NOTE - PROGRESS NOTE DETAILS
Labs, CAT scan, ultrasound results discussed no acute findings, patient to be discharged home and follow-up as outpatient with primary care doctor.

## 2023-03-07 NOTE — ED PROVIDER NOTE - OBJECTIVE STATEMENT
57-year-old female with past medical history of Donahue, cervical and lumbar herniated disc, PE and DVT on Eliquis 2-1/2 mg p.o. twice daily, sent to ER by hematologist for evaluation of right-sided chest pain and right leg pain started on Sunday.  Denies shortness of breath, no fever and no cough.  Patient states that she was afraid that her pulmonary embolism came back and took a 5 mg dose of Eliquis this morning and this evening.

## 2023-03-07 NOTE — ED ADULT NURSE NOTE - NSICDXPASTMEDICALHX_GEN_ALL_CORE_FT
PAST MEDICAL HISTORY:  No pertinent past medical history seasonal allergies  reflux    Pulmonary embolism

## 2023-03-28 ENCOUNTER — APPOINTMENT (OUTPATIENT)
Dept: RHEUMATOLOGY | Facility: CLINIC | Age: 58
End: 2023-03-28
Payer: COMMERCIAL

## 2023-03-28 ENCOUNTER — LABORATORY RESULT (OUTPATIENT)
Age: 58
End: 2023-03-28

## 2023-03-28 VITALS
OXYGEN SATURATION: 95 % | SYSTOLIC BLOOD PRESSURE: 129 MMHG | BODY MASS INDEX: 27.49 KG/M2 | HEIGHT: 65 IN | WEIGHT: 165 LBS | RESPIRATION RATE: 14 BRPM | HEART RATE: 86 BPM | DIASTOLIC BLOOD PRESSURE: 84 MMHG

## 2023-03-28 DIAGNOSIS — M53.9 DORSOPATHY, UNSPECIFIED: ICD-10-CM

## 2023-03-28 DIAGNOSIS — M47.812 SPONDYLOSIS W/OUT MYELOPATHY OR RADICULOPATHY, CERVICAL REGION: ICD-10-CM

## 2023-03-28 DIAGNOSIS — L53.9 ERYTHEMATOUS CONDITION, UNSPECIFIED: ICD-10-CM

## 2023-03-28 DIAGNOSIS — K12.1 OTHER FORMS OF STOMATITIS: ICD-10-CM

## 2023-03-28 PROBLEM — I26.99 OTHER PULMONARY EMBOLISM WITHOUT ACUTE COR PULMONALE: Chronic | Status: ACTIVE | Noted: 2023-03-07

## 2023-03-28 PROCEDURE — 99205 OFFICE O/P NEW HI 60 MIN: CPT

## 2023-03-29 LAB
BASOPHILS # BLD AUTO: 0.05 K/UL
BASOPHILS NFR BLD AUTO: 0.8 %
EOSINOPHIL # BLD AUTO: 0.06 K/UL
EOSINOPHIL NFR BLD AUTO: 1 %
ERYTHROCYTE [SEDIMENTATION RATE] IN BLOOD BY WESTERGREN METHOD: 24 MM/HR
HCT VFR BLD CALC: 44.5 %
HGB BLD-MCNC: 13.8 G/DL
IMM GRANULOCYTES NFR BLD AUTO: 0.2 %
LYMPHOCYTES # BLD AUTO: 2.41 K/UL
LYMPHOCYTES NFR BLD AUTO: 40.8 %
MAN DIFF?: NORMAL
MCHC RBC-ENTMCNC: 28.9 PG
MCHC RBC-ENTMCNC: 31 GM/DL
MCV RBC AUTO: 93.1 FL
MONOCYTES # BLD AUTO: 0.35 K/UL
MONOCYTES NFR BLD AUTO: 5.9 %
NEUTROPHILS # BLD AUTO: 3.02 K/UL
NEUTROPHILS NFR BLD AUTO: 51.3 %
PLATELET # BLD AUTO: 337 K/UL
RBC # BLD: 4.78 M/UL
RBC # FLD: 12.8 %
WBC # FLD AUTO: 5.9 K/UL

## 2023-04-01 NOTE — ASSESSMENT
[FreeTextEntry1] : Ms. Patel has a PMHx of DVT/PE 2/2 leg fracture, OA spine/knees, GERD, NIMCO here for evaluation\par \par # r/o aiCTD\par c/o fatigue, oral ulcers, DVT/PE, sicca, facial/chest erythema.  \par -- no s/s of inflammatory aiCTD\par -- check inflammatory markers \par -- check serologies and sub serologies \par -- oral ulcer - check labs - magic mouth wash\par -- sicca - OTC artificial tears \par \par # OA \par multiple sites knee, cervical spondylosis and DDD lumbar spine \par -- retrieve MRI \par -- observe for now but may benefit from PT \par \par \par More than 50% of the encounter was spent counseling the patient on differential, workup, disease course and treatment/management.  Education was provided to the patient during this encounter.  All questions and concerns were addressed and answered.   The patient verbalized understanding and agreed to the plan. \par \par Patient has been instructed to call for an appointment if new symptoms develop.\par Patient has been instructed to make a followup appointment in 3 weeks\par \par Time spent on the encounter included, but is not limited to, preparing to see the patient, obtaining and/or reviewing separately obtained history, performing the evaluation, counseling and educating, independently interpreting results with communication to patient, order placement, referring and/or communicating with other health professionals as described, and documenting clinical information in the electronic health record\par

## 2023-04-01 NOTE — HISTORY OF PRESENT ILLNESS
[FreeTextEntry1] : lindsey shearer is mom \par \par Ms. Shearer has a PMHx of DVT/PE 2/2 leg fracture, OA spine/knees, GERD, NIMCO here for evaluation\par \par She has been relatively well through her life but over the last year and half she has had multiple medical issues and wanted to have a rheum evaluation to ensure no systemic illness\par \par Symptoms and concerns:\par - chest and forehead get red, rash not lasting nor ulcerative\par - dvt/pe last year following a broken leg \par - enormous stress at home (dad just returned from hospital, mom with dementia and arthritis) \par - Lower back pain with radiation down the right leg like a spider --> MRI lumbar spine and neck - c5-6 \par - non-smoker not on bc - exercise whole life \par - during workup ct nad MRI --> liver mass and liver disease --> hepatologist - cirrhosis buy the look of the MRI - did the w/u and everything was negative - fibroscan was negative - \par - exercise and eating better \par - sore under the tongue - never had that before - a bit of a dry mouth \par - on gabapentin for the back and leg pain \par - frequent urination - going to urologist - no other kidney issues \par - difficulty sleeping - was prescribed trazodone but doesn’t like that \par - CFS when in 20's -h/o EBV - always tired, cant sleep, insomnia - sleep studies with faria tests very light sleep apnea and insomnia - \par \par Rheum ROS \par - denies RP, sicca, oral ulcers, rashes, photosensitivity.   \par - denies skin tightening, ulcerations, nodules\par - denies constitutional symptoms, fatigue, night sweats.  weight is stable \par - Denies psoriasis, IBD, Inflammatory eye disease, STD, infectious diarrhea\par - breathes well without h/o of pleuritis, pericarditis.  renal function is normal and urine is not frothy\par - muscles are strong and there is no neurologic issues\par \par

## 2023-04-03 LAB
ALBUMIN MFR SERPL ELPH: 60.8 %
ALBUMIN SERPL ELPH-MCNC: 4.5 G/DL
ALBUMIN SERPL-MCNC: 4.4 G/DL
ALBUMIN/GLOB SERPL: 1.5 RATIO
ALP BLD-CCNC: 78 U/L
ALPHA1 GLOB MFR SERPL ELPH: 3.8 %
ALPHA1 GLOB SERPL ELPH-MCNC: 0.3 G/DL
ALPHA2 GLOB MFR SERPL ELPH: 9.3 %
ALPHA2 GLOB SERPL ELPH-MCNC: 0.7 G/DL
ALT SERPL-CCNC: 26 U/L
ANA SER IF-ACNC: NEGATIVE
ANION GAP SERPL CALC-SCNC: 16 MMOL/L
AST SERPL-CCNC: 19 U/L
B-GLOBULIN MFR SERPL ELPH: 13.2 %
B-GLOBULIN SERPL ELPH-MCNC: 1 G/DL
BILIRUB SERPL-MCNC: <0.2 MG/DL
BUN SERPL-MCNC: 19 MG/DL
C3 SERPL-MCNC: 140 MG/DL
C4 SERPL-MCNC: 49 MG/DL
CALCIUM SERPL-MCNC: 10.1 MG/DL
CENTROMERE IGG SER-ACNC: <0.2 CD:130001892
CH50 SERPL-MCNC: 67 U/ML
CHLORIDE SERPL-SCNC: 107 MMOL/L
CHROMATIN AB SERPL-ACNC: <0.2 AL
CO2 SERPL-SCNC: 21 MMOL/L
CREAT SERPL-MCNC: 0.67 MG/DL
CREAT SPEC-SCNC: 62 MG/DL
CREAT/PROT UR: 0.1 RATIO
CRP SERPL-MCNC: 3 MG/L
DEPRECATED KAPPA LC FREE/LAMBDA SER: 1.38 RATIO
DSDNA AB SER-ACNC: 12 IU/ML
EGFR: 103 ML/MIN/1.73M2
ENA RNP AB SER IA-ACNC: <0.2 AL
ENA SCL70 IGG SER IA-ACNC: <0.2 AL
ENA SM AB SER IA-ACNC: <0.2 AL
ENA SS-A AB SER IA-ACNC: <0.2 AL
ENA SS-B AB SER IA-ACNC: <0.2 AL
GAMMA GLOB FLD ELPH-MCNC: 0.9 G/DL
GAMMA GLOB MFR SERPL ELPH: 12.9 %
HISTONE AB SER QL: 0.8 UNITS
IGA SER QL IEP: 308 MG/DL
IGG SER QL IEP: 1007 MG/DL
IGM SER QL IEP: 65 MG/DL
INTERPRETATION SERPL IEP-IMP: NORMAL
KAPPA LC CSF-MCNC: 1.28 MG/DL
KAPPA LC SERPL-MCNC: 1.76 MG/DL
M PROTEIN SPEC IFE-MCNC: NORMAL
POTASSIUM SERPL-SCNC: 4.7 MMOL/L
PROT SERPL-MCNC: 7.3 G/DL
PROT UR-MCNC: 6 MG/DL
RHEUMATOID FACT SER QL: <10 IU/ML
RNA POLYMERASE III IGG: 5 UNITS
SODIUM SERPL-SCNC: 143 MMOL/L
THYROGLOB AB SERPL-ACNC: <20 IU/ML
THYROPEROXIDASE AB SERPL IA-ACNC: <10 IU/ML

## 2023-04-03 RX ORDER — DIPHENHYDRAMINE HYDROCHLORIDE 2.5 MG/ML
12.5 LIQUID ORAL
Qty: 300 | Refills: 0 | Status: ACTIVE | COMMUNITY
Start: 2023-04-01

## 2023-04-07 NOTE — PROVIDER CONTACT NOTE (CRITICAL VALUE NOTIFICATION) - DATE AND TIME:
16-Mar-2022 15:33 16-Mar-2022 15:32 Complex Repair And Xenograft Text: The defect edges were debeveled with a #15 scalpel blade.  The primary defect was closed partially with a complex linear closure.  Given the location of the defect, shape of the defect and the proximity to free margins a xenograft was deemed most appropriate to repair the remaining defect.  The graft was trimmed to fit the size of the remaining defect.  The graft was then placed in the primary defect, oriented appropriately, and sutured into place.

## 2023-04-18 LAB
EJ AB SER QL: NEGATIVE
ENA JO1 AB SER IA-ACNC: <20 UNITS
ENA PM/SCL AB SER-ACNC: <20 UNITS
ENA SM+RNP AB SER IA-ACNC: <20 UNITS
ENA SS-A IGG SER QL: <20 UNITS
FIBRILLARIN AB SER QL: NEGATIVE
KU AB SER QL: NEGATIVE
MDA-5 (P140)(CADM-140): <20 UNITS
MI2 AB SER QL: NEGATIVE
NXP-2 (P140): <20 UNITS
OJ AB SER QL: NEGATIVE
PL12 AB SER QL: NEGATIVE
PL7 AB SER QL: NEGATIVE
SRP AB SERPL QL: NEGATIVE
TIF GAMMA (P155/140): <20 UNITS
U2 SNRNP AB SER QL: NEGATIVE

## 2023-04-25 ENCOUNTER — APPOINTMENT (OUTPATIENT)
Dept: RHEUMATOLOGY | Facility: CLINIC | Age: 58
End: 2023-04-25
Payer: COMMERCIAL

## 2023-04-25 VITALS
DIASTOLIC BLOOD PRESSURE: 92 MMHG | BODY MASS INDEX: 26.66 KG/M2 | SYSTOLIC BLOOD PRESSURE: 130 MMHG | HEIGHT: 65 IN | WEIGHT: 160 LBS | OXYGEN SATURATION: 98 % | RESPIRATION RATE: 16 BRPM | HEART RATE: 87 BPM

## 2023-04-25 DIAGNOSIS — M35.00 SICCA SYNDROME, UNSPECIFIED: ICD-10-CM

## 2023-04-25 DIAGNOSIS — R79.1 ABNORMAL COAGULATION PROFILE: ICD-10-CM

## 2023-04-25 DIAGNOSIS — M17.0 BILATERAL PRIMARY OSTEOARTHRITIS OF KNEE: ICD-10-CM

## 2023-04-25 PROCEDURE — 99214 OFFICE O/P EST MOD 30 MIN: CPT

## 2023-04-27 PROBLEM — M35.00 SICCA: Status: ACTIVE | Noted: 2023-03-28

## 2023-04-27 NOTE — HISTORY OF PRESENT ILLNESS
[FreeTextEntry1] : lindsey shearer is mom \par \par Ms. Shearer has a PMHx of DVT/PE 2/2 leg fracture, OA spine/knees, GERD, NIMCO here for evaluation\par \par presenting hx\par She has been relatively well through her life but over the last year and half she has had multiple medical issues and wanted to have a rheum evaluation to ensure no systemic illness\par \par Symptoms and concerns:\par - chest and forehead get red, rash not lasting nor ulcerative\par - dvt/pe last year following a broken leg \par - enormous stress at home (dad just returned from hospital, mom with dementia and arthritis) \par - Lower back pain with radiation down the right leg like a spider --> MRI lumbar spine and neck - c5-6 \par - non-smoker not on bc - exercise whole life \par - during workup ct nad MRI --> liver mass and liver disease --> hepatologist - cirrhosis buy the look of the MRI - did the w/u and everything was negative - fibroscan was negative - \par - exercise and eating better \par - sore under the tongue - never had that before - a bit of a dry mouth \par - on gabapentin for the back and leg pain \par - frequent urination - going to urologist - no other kidney issues \par - difficulty sleeping - was prescribed trazodone but doesn’t like that \par - CFS when in 20's -h/o EBV - always tired, cant sleep, insomnia - sleep studies with faria tests very light sleep apnea and insomnia - \par \par INTERVAL Hx\par doing well \par here to review b/w\par \par

## 2023-04-27 NOTE — ASSESSMENT
[FreeTextEntry1] : Ms. Patel has a PMHx of DVT/PE 2/2 leg fracture, OA spine/knees, GERD, NIMCO here for evaluation\par \par # r/o aiCTD\par c/o fatigue, oral ulcers, DVT/PE, sicca, facial/chest erythema.  \par -- no s/s of inflammatory aiCTD and workup with serologies essentially negative \par -- there is a positive mixing study, however on eliquis (therefore unreliable) - doubt APLS - observe for now\par -- oral ulcer - check labs - magic mouth wash\par -- sicca - OTC artificial tears - SS, RF and DAVIDSON ab are all negative\par \par # OA \par multiple sites knee, cervical spondylosis and DDD lumbar spine \par -- retrieve MRI \par -- observe for now but may benefit from PT \par -- tylenol PRN\par \par \par More than 50% of the encounter was spent counseling the patient on differential, workup, disease course and treatment/management.  Education was provided to the patient during this encounter.  All questions and concerns were addressed and answered.   The patient verbalized understanding and agreed to the plan. \par \par Patient has been instructed to call for an appointment if new symptoms develop.\par Patient has been instructed to make a followup appointment in 3 weeks\par \par Time spent on the encounter included, but is not limited to, preparing to see the patient, obtaining and/or reviewing separately obtained history, performing the evaluation, counseling and educating, independently interpreting results with communication to patient, order placement, referring and/or communicating with other health professionals as described, and documenting clinical information in the electronic health record\par

## 2023-06-08 ENCOUNTER — APPOINTMENT (OUTPATIENT)
Dept: HEPATOLOGY | Facility: CLINIC | Age: 58
End: 2023-06-08
Payer: COMMERCIAL

## 2023-06-08 DIAGNOSIS — K75.81 NONALCOHOLIC STEATOHEPATITIS (NASH): ICD-10-CM

## 2023-06-08 DIAGNOSIS — K74.60 NONALCOHOLIC STEATOHEPATITIS (NASH): ICD-10-CM

## 2023-06-08 DIAGNOSIS — K76.89 OTHER SPECIFIED DISEASES OF LIVER: ICD-10-CM

## 2023-06-08 DIAGNOSIS — K76.0 FATTY (CHANGE OF) LIVER, NOT ELSEWHERE CLASSIFIED: ICD-10-CM

## 2023-06-08 PROCEDURE — 99214 OFFICE O/P EST MOD 30 MIN: CPT

## 2023-06-09 LAB
ALBUMIN SERPL ELPH-MCNC: 4.6 G/DL
ALP BLD-CCNC: 70 U/L
ALT SERPL-CCNC: 21 U/L
ANION GAP SERPL CALC-SCNC: 11 MMOL/L
AST SERPL-CCNC: 18 U/L
BILIRUB SERPL-MCNC: 0.2 MG/DL
BUN SERPL-MCNC: 22 MG/DL
CALCIUM SERPL-MCNC: 9.6 MG/DL
CHLORIDE SERPL-SCNC: 105 MMOL/L
CO2 SERPL-SCNC: 26 MMOL/L
CREAT SERPL-MCNC: 0.71 MG/DL
EGFR: 100 ML/MIN/1.73M2
GLUCOSE SERPL-MCNC: 85 MG/DL
POTASSIUM SERPL-SCNC: 4.5 MMOL/L
PROT SERPL-MCNC: 6.9 G/DL
SODIUM SERPL-SCNC: 142 MMOL/L

## 2023-06-11 NOTE — HISTORY OF PRESENT ILLNESS
[FreeTextEntry1] : 57F with recently diagnosed Fatty liver and possible cirrhosis with an FNH\par MRI 11/2022 [facility] with Fatty Liver hepatomegaly FNH 4,2 portal HTN with varices and biliary hamartomas\par No FHX of lvier disease\par No ETOH use\par No IVDU\par No smoking\par No hx of autoimmune liver disease\par Hx of EBV\par +15-20 lbs weight gain over 10 months\par She recently was diagnosed with DVT and PE - completed anticoagulation\par Takes milk thistle\par No herbals\par \par She has no complaints currently\par She denies any f/c/n/v/cp/sob\par She denies any GI bleeding\par No confusion\par No abd distention\par No LE Edema\par \par Patient obtained Fibroscan a few months ago without any suggestion of advances fibrosis

## 2023-06-11 NOTE — ASSESSMENT
[FreeTextEntry1] : 57F with incidentally discovered fatty liver\par This is likely related to weight gain after her DVT \par \par Counseled on natural hx of fatty liver disease\par Counseled on diet - limiting carbs and increasing protein and vegetable intake\par 30min exercise daily\par Goal 7-10% weight loss\par Discussed concern of disease progression to MAXWELL and more advanced fibrosis as well\par \par Fibroscan with F0F1\par MRI reviewed from November - tiny varices but no overt evidence of cirrhosis - ?non cirrhotic portal HTN\par \par #FNH\par - benign and not malignant\par - will be getting routine imaging in future\par \par WIll schedule patient for MRE\par \par

## 2023-07-19 ENCOUNTER — APPOINTMENT (OUTPATIENT)
Dept: MRI IMAGING | Facility: IMAGING CENTER | Age: 58
End: 2023-07-19
Payer: COMMERCIAL

## 2023-07-19 ENCOUNTER — OUTPATIENT (OUTPATIENT)
Dept: OUTPATIENT SERVICES | Facility: HOSPITAL | Age: 58
LOS: 1 days | End: 2023-07-19
Payer: COMMERCIAL

## 2023-07-19 ENCOUNTER — RESULT REVIEW (OUTPATIENT)
Age: 58
End: 2023-07-19

## 2023-07-19 DIAGNOSIS — K76.89 OTHER SPECIFIED DISEASES OF LIVER: ICD-10-CM

## 2023-07-19 PROCEDURE — A9585: CPT

## 2023-07-19 PROCEDURE — 74183 MRI ABD W/O CNTR FLWD CNTR: CPT | Mod: 26

## 2023-07-19 PROCEDURE — 76391 MR ELASTOGRAPHY: CPT

## 2023-07-19 PROCEDURE — 76391 MR ELASTOGRAPHY: CPT | Mod: 26

## 2023-07-19 PROCEDURE — 74183 MRI ABD W/O CNTR FLWD CNTR: CPT

## 2023-07-25 ENCOUNTER — TRANSCRIPTION ENCOUNTER (OUTPATIENT)
Age: 58
End: 2023-07-25

## 2023-08-17 ENCOUNTER — APPOINTMENT (OUTPATIENT)
Dept: CARDIOLOGY | Facility: CLINIC | Age: 58
End: 2023-08-17
Payer: COMMERCIAL

## 2023-08-17 VITALS
HEART RATE: 74 BPM | DIASTOLIC BLOOD PRESSURE: 80 MMHG | WEIGHT: 145 LBS | BODY MASS INDEX: 24.16 KG/M2 | SYSTOLIC BLOOD PRESSURE: 120 MMHG | HEIGHT: 65 IN | OXYGEN SATURATION: 98 %

## 2023-08-17 PROCEDURE — 99214 OFFICE O/P EST MOD 30 MIN: CPT

## 2023-08-17 NOTE — REASON FOR VISIT
[Follow-Up - Clinic] : a clinic follow-up of [FreeTextEntry1] : 8/17/2023 Remains on eliquis 2.5mg BID Followed by Dr. Montez No repeat venous duplex She reports that she will need arthroscopy of the knee - R knee.  Dr. Conde Notes that her ematologist might take her off ac in the fall. Seen by rheum - no issues has varices seen by liver - sent for MRE  Duplex in march - although read as no DVT, upon review of images and body of text, there is chronic DVT in the right poplietal vein       57F here to establish care  Followed by Dr. Thomas Low titer of Cardiolipin IgG 13.9, on repeat was normal B2GP negative Dimer in Sept 2022 negative  She is still going to PT She was on eliquis 5mg BID , now is on Eliquis 2.5mg BID - this was reduced recently by hematology.    Meds: Gabapentin Eliquis 2.5mg BID Trazadone Omeprazole  Coloscopy - polyps 4 years ago, no cancer. Mammogram- normal Pap smear - ok   Mother with history of DVT.   Hematology:  hon montez  Venos duplex 10/28/2022 Chronic incompletely occlusive thrombus in the right pop and post tibitial vins   Duplex 7/25/2022:  R FV, Pop and PT V   4/13/2022:  DVT mid and distal femoral veins and pop and PT V  Prothrombin negative FVL negative    Testing and hospital stay below: VESSELS: Within normal limits. The IVC and pelvic veins opacify unremarkably. RETROPERITONEUM/LYMPH NODES: No lymphadenopathy. ABDOMINAL WALL: Within normal limits. BONES: Within normal limits.  IMPRESSION: Right lower lobe pulmonary embolism with small infarct. Findings communicated to Dr. Donis at time of interpretation.    RIGHT: Acute DVT is identified within the right common femoral vein, femoral vein, popliteal vein as well as visualized segments of the posterior tibial vein and peroneal vein.  LEFT: Normal compressibility of the LEFT common femoral, femoral and popliteal veins. Doppler examination shows normal spontaneous and phasic flow. No LEFT calf vein thrombosis is detected.  IMPRESSION: Extensive acute DVT right lower extremity. No evidence for left lower extremity DVT.  From hospital stay in Spring of 2022:  This is a 55 y/o Female with no significant PMHx who in fractured her right proximal tibia in december and has been in a knee immobilizer since. Yesterday she presented to the ED with RUQ pain and a fever (101.0) and was found to have a right lower lobe pulmonary embolism. She was then found to have an extensive RLE DVT involving the common femoral, femoral, popliteal posterior tibial and peroneal veins. She denies pain of the right leg from the hip to the toes, denies swelling, color changes of the skin, changes in sensation or changes in the ability to move the extremity. Denies a history of blood clots. Reports that over the last week she has been more sedentary than usual, stating that her father returned from the hospital so she has been sitting with him since he's been home.

## 2023-08-17 NOTE — ASSESSMENT
[FreeTextEntry1] : Assessment: 1.  Fatty Liver - pending liver doc 2.  Provoked DVT /PE 3. Still with chronic changes on recent duplex 4.  Now on half dose a/c 5.  CT showed no PE Sept 29th   Plan: 1.   She should continue eliquis 2.5mg BID for now... given her chronic DVT on the right and also her mother's history of VTE.  2.  If she is to fly, she should take an additional eliquis 2.5mg 1 hour prior to flight 3   She still has chronic DVT on the right pop -will order repeat venous duplex.   5.  In the future, we can consider cessation if DVT resolves, such as aspirin paried with DVT ppx for high risk situations. Not yet 6.  Return in 6 months  7.  If Eliquis needs to be held briefly for orthopedic arthroscopy, i have no objection to this , then resume afterwards (2 day hold)

## 2023-08-17 NOTE — PHYSICAL EXAM
[General Appearance - Well Developed] : well developed [Normal Appearance] : normal appearance [Well Groomed] : well groomed [General Appearance - Well Nourished] : well nourished [No Deformities] : no deformities [General Appearance - In No Acute Distress] : no acute distress [Normal Conjunctiva] : the conjunctiva exhibited no abnormalities [Eyelids - No Xanthelasma] : the eyelids demonstrated no xanthelasmas [Normal Oral Mucosa] : normal oral mucosa [No Oral Pallor] : no oral pallor [No Oral Cyanosis] : no oral cyanosis [Normal Jugular Venous A Waves Present] : normal jugular venous A waves present [Normal Jugular Venous V Waves Present] : normal jugular venous V waves present [No Jugular Venous Eng A Waves] : no jugular venous eng A waves [Heart Rate And Rhythm] : heart rate and rhythm were normal [Murmurs] : no murmurs present [Heart Sounds] : normal S1 and S2 [Abdomen Soft] : soft [Abdomen Tenderness] : non-tender [Abdomen Mass (___ Cm)] : no abdominal mass palpated [Abnormal Walk] : normal gait [Gait - Sufficient For Exercise Testing] : the gait was sufficient for exercise testing [Nail Clubbing] : no clubbing of the fingernails [Cyanosis, Localized] : no localized cyanosis [Petechial Hemorrhages (___cm)] : no petechial hemorrhages [] : no ischemic changes

## 2023-08-29 ENCOUNTER — APPOINTMENT (OUTPATIENT)
Dept: ULTRASOUND IMAGING | Facility: CLINIC | Age: 58
End: 2023-08-29
Payer: COMMERCIAL

## 2023-08-29 ENCOUNTER — OUTPATIENT (OUTPATIENT)
Dept: OUTPATIENT SERVICES | Facility: HOSPITAL | Age: 58
LOS: 1 days | End: 2023-08-29
Payer: COMMERCIAL

## 2023-08-29 ENCOUNTER — NON-APPOINTMENT (OUTPATIENT)
Age: 58
End: 2023-08-29

## 2023-08-29 DIAGNOSIS — Z00.8 ENCOUNTER FOR OTHER GENERAL EXAMINATION: ICD-10-CM

## 2023-08-29 DIAGNOSIS — I82.409 ACUTE EMBOLISM AND THROMBOSIS OF UNSPECIFIED DEEP VEINS OF UNSPECIFIED LOWER EXTREMITY: ICD-10-CM

## 2023-08-29 PROCEDURE — 93970 EXTREMITY STUDY: CPT | Mod: 26

## 2023-08-29 PROCEDURE — 93970 EXTREMITY STUDY: CPT

## 2023-08-30 ENCOUNTER — APPOINTMENT (OUTPATIENT)
Dept: HEPATOLOGY | Facility: CLINIC | Age: 58
End: 2023-08-30
Payer: COMMERCIAL

## 2023-08-30 VITALS
SYSTOLIC BLOOD PRESSURE: 100 MMHG | WEIGHT: 145 LBS | HEART RATE: 83 BPM | DIASTOLIC BLOOD PRESSURE: 69 MMHG | HEIGHT: 65 IN | RESPIRATION RATE: 16 BRPM | OXYGEN SATURATION: 97 % | TEMPERATURE: 96.6 F | BODY MASS INDEX: 24.16 KG/M2

## 2023-08-30 DIAGNOSIS — Z87.19 PERSONAL HISTORY OF OTHER DISEASES OF THE DIGESTIVE SYSTEM: ICD-10-CM

## 2023-08-30 PROCEDURE — 99213 OFFICE O/P EST LOW 20 MIN: CPT

## 2023-08-30 NOTE — ASSESSMENT
[FreeTextEntry1] : 58F with stable FNH no cirrhosis - no varices or portal HTN on imaging FNH is a benign lesion and unlikely to transform into a malignancy Recommendation for repat MRI in about a year with EOVIST  Counseled on natural hx of fatty liver disease Counseled on diet - limiting carbs and increasing protein and vegetable intake 30min exercise daily Goal 7-10% weight loss Discussed concern of disease progression to MAXWELL and more advanced fibrosis as well  Patient will return to clinic after MRI next year Will call us to schedule MRI in June 2024

## 2023-08-30 NOTE — HISTORY OF PRESENT ILLNESS
[FreeTextEntry1] : 58F with recently diagnosed Fatty liver and possible cirrhosis with an FNH  MRI 11/2022 [facility] with Fatty Liver hepatomegaly FNH 4,2 portal HTN with varices and biliary hamartomas No FHX of lvier disease No ETOH use No IVDU No smoking No hx of autoimmune liver disease Hx of EBV  +15-20 lbs weight gain over 10 months She recently was diagnosed with DVT and PE - still remains on anticoagulation negative duplex  recently Takes milk thistle but no other herbals She has no complaints currently - She denies any f/c/n/v/cp/sob She denies any GI bleeding or confusion No abd distention and No LE Edema  Patient obtained Fibroscan a few months ago without any suggestion of advances fibrosis Recent MRI and MRE WITHOUT cirrhosis or portal HTN or fibrosis Liver lesion is stable and c/w FNH There is mild steatosis

## 2023-09-29 ENCOUNTER — OUTPATIENT (OUTPATIENT)
Dept: OUTPATIENT SERVICES | Facility: HOSPITAL | Age: 58
LOS: 1 days | End: 2023-09-29
Payer: COMMERCIAL

## 2023-09-29 VITALS
OXYGEN SATURATION: 98 % | SYSTOLIC BLOOD PRESSURE: 108 MMHG | HEART RATE: 85 BPM | TEMPERATURE: 98 F | HEIGHT: 65 IN | WEIGHT: 134.92 LBS | DIASTOLIC BLOOD PRESSURE: 74 MMHG | RESPIRATION RATE: 15 BRPM

## 2023-09-29 DIAGNOSIS — S83.271D COMPLEX TEAR OF LATERAL MENISCUS, CURRENT INJURY, RIGHT KNEE, SUBSEQUENT ENCOUNTER: ICD-10-CM

## 2023-09-29 DIAGNOSIS — Z98.890 OTHER SPECIFIED POSTPROCEDURAL STATES: Chronic | ICD-10-CM

## 2023-09-29 DIAGNOSIS — Z01.818 ENCOUNTER FOR OTHER PREPROCEDURAL EXAMINATION: ICD-10-CM

## 2023-09-29 LAB
ALBUMIN SERPL ELPH-MCNC: 3.9 G/DL — SIGNIFICANT CHANGE UP (ref 3.3–5)
ALP SERPL-CCNC: 68 U/L — SIGNIFICANT CHANGE UP (ref 30–120)
ALT FLD-CCNC: 40 U/L — SIGNIFICANT CHANGE UP (ref 10–60)
ANION GAP SERPL CALC-SCNC: 6 MMOL/L — SIGNIFICANT CHANGE UP (ref 5–17)
AST SERPL-CCNC: 21 U/L — SIGNIFICANT CHANGE UP (ref 10–40)
BILIRUB SERPL-MCNC: 0.3 MG/DL — SIGNIFICANT CHANGE UP (ref 0.2–1.2)
BUN SERPL-MCNC: 15 MG/DL — SIGNIFICANT CHANGE UP (ref 7–23)
CALCIUM SERPL-MCNC: 9.6 MG/DL — SIGNIFICANT CHANGE UP (ref 8.4–10.5)
CHLORIDE SERPL-SCNC: 104 MMOL/L — SIGNIFICANT CHANGE UP (ref 96–108)
CO2 SERPL-SCNC: 31 MMOL/L — SIGNIFICANT CHANGE UP (ref 22–31)
CREAT SERPL-MCNC: 0.81 MG/DL — SIGNIFICANT CHANGE UP (ref 0.5–1.3)
EGFR: 85 ML/MIN/1.73M2 — SIGNIFICANT CHANGE UP
GLUCOSE SERPL-MCNC: 87 MG/DL — SIGNIFICANT CHANGE UP (ref 70–99)
HCT VFR BLD CALC: 42.5 % — SIGNIFICANT CHANGE UP (ref 34.5–45)
HGB BLD-MCNC: 13.5 G/DL — SIGNIFICANT CHANGE UP (ref 11.5–15.5)
MCHC RBC-ENTMCNC: 27.8 PG — SIGNIFICANT CHANGE UP (ref 27–34)
MCHC RBC-ENTMCNC: 31.8 GM/DL — LOW (ref 32–36)
MCV RBC AUTO: 87.6 FL — SIGNIFICANT CHANGE UP (ref 80–100)
NRBC # BLD: 0 /100 WBCS — SIGNIFICANT CHANGE UP (ref 0–0)
PLATELET # BLD AUTO: 317 K/UL — SIGNIFICANT CHANGE UP (ref 150–400)
POTASSIUM SERPL-MCNC: 3.9 MMOL/L — SIGNIFICANT CHANGE UP (ref 3.5–5.3)
POTASSIUM SERPL-SCNC: 3.9 MMOL/L — SIGNIFICANT CHANGE UP (ref 3.5–5.3)
PROT SERPL-MCNC: 7.4 G/DL — SIGNIFICANT CHANGE UP (ref 6–8.3)
RBC # BLD: 4.85 M/UL — SIGNIFICANT CHANGE UP (ref 3.8–5.2)
RBC # FLD: 11.8 % — SIGNIFICANT CHANGE UP (ref 10.3–14.5)
SODIUM SERPL-SCNC: 141 MMOL/L — SIGNIFICANT CHANGE UP (ref 135–145)
WBC # BLD: 6.13 K/UL — SIGNIFICANT CHANGE UP (ref 3.8–10.5)
WBC # FLD AUTO: 6.13 K/UL — SIGNIFICANT CHANGE UP (ref 3.8–10.5)

## 2023-09-29 PROCEDURE — 36415 COLL VENOUS BLD VENIPUNCTURE: CPT

## 2023-09-29 PROCEDURE — 85027 COMPLETE CBC AUTOMATED: CPT

## 2023-09-29 PROCEDURE — 93005 ELECTROCARDIOGRAM TRACING: CPT

## 2023-09-29 PROCEDURE — G0463: CPT

## 2023-09-29 PROCEDURE — 80053 COMPREHEN METABOLIC PANEL: CPT

## 2023-09-29 PROCEDURE — 93010 ELECTROCARDIOGRAM REPORT: CPT

## 2023-09-29 NOTE — H&P PST ADULT - ATTENDING COMMENTS
The patient has a right knee medial and lateral meniscus tear. She has failed conservative management. I recommend a right knee arthroscopy with partial medial and lateral meniscectomies v. repair. The risks of the procedure, including bleeding, infection, pain, stiffness, damage to muscles / vessels / nerves, and need for additional surgery, were reviewed. The benefits, including decreased pain and improved function, were also reviewed. The patient understands the risks and benefits and wishes to proceed with surgery.

## 2023-09-29 NOTE — H&P PST ADULT - HISTORY OF PRESENT ILLNESS
59 y/o female with right knee pain for couple of months. Failed conservative therapy.  MRI revealed lateral meniscus tear and was advised surgery

## 2023-09-29 NOTE — H&P PST ADULT - NSICDXFAMILYHX_GEN_ALL_CORE_FT
FAMILY HISTORY:  Father  Still living? No  Family history of pulmonary embolism, Age at diagnosis: Age Unknown  Family history of pulmonary fibrosis, Age at diagnosis: Age Unknown  Family history of stroke, Age at diagnosis: Age Unknown

## 2023-09-29 NOTE — H&P PST ADULT - NSICDXPASTMEDICALHX_GEN_ALL_CORE_FT
PAST MEDICAL HISTORY:  H/O peripheral neuropathy     No pertinent past medical history seasonal allergies  reflux    Nonalcoholic fatty liver disease     NIMCO (obstructive sleep apnea)     Overactive bladder     Pulmonary embolism     Traumatic tear of lateral meniscus of knee

## 2023-09-29 NOTE — H&P PST ADULT - ASSESSMENT
59 y/o female with right knee pain  Planned surgery.- right knee arthroscopy  Will obtain medical clearance/hematology cancer  will stop Eliquis as instructed   Pre op instructions provided  Instructions provided on medications to continue and to take the day morning of surgery

## 2023-10-06 RX ORDER — CHLORHEXIDINE GLUCONATE 213 G/1000ML
1 SOLUTION TOPICAL DAILY
Refills: 0 | Status: DISCONTINUED | OUTPATIENT
Start: 2023-10-12 | End: 2023-10-26

## 2023-10-11 ENCOUNTER — TRANSCRIPTION ENCOUNTER (OUTPATIENT)
Age: 58
End: 2023-10-11

## 2023-10-12 ENCOUNTER — OUTPATIENT (OUTPATIENT)
Dept: OUTPATIENT SERVICES | Facility: HOSPITAL | Age: 58
LOS: 1 days | End: 2023-10-12
Payer: COMMERCIAL

## 2023-10-12 ENCOUNTER — TRANSCRIPTION ENCOUNTER (OUTPATIENT)
Age: 58
End: 2023-10-12

## 2023-10-12 VITALS
SYSTOLIC BLOOD PRESSURE: 124 MMHG | RESPIRATION RATE: 15 BRPM | OXYGEN SATURATION: 99 % | HEART RATE: 70 BPM | DIASTOLIC BLOOD PRESSURE: 72 MMHG

## 2023-10-12 VITALS
HEART RATE: 74 BPM | WEIGHT: 135.58 LBS | TEMPERATURE: 98 F | SYSTOLIC BLOOD PRESSURE: 119 MMHG | OXYGEN SATURATION: 100 % | HEIGHT: 65 IN | RESPIRATION RATE: 16 BRPM | DIASTOLIC BLOOD PRESSURE: 82 MMHG

## 2023-10-12 DIAGNOSIS — S83.271D COMPLEX TEAR OF LATERAL MENISCUS, CURRENT INJURY, RIGHT KNEE, SUBSEQUENT ENCOUNTER: ICD-10-CM

## 2023-10-12 DIAGNOSIS — Z98.890 OTHER SPECIFIED POSTPROCEDURAL STATES: Chronic | ICD-10-CM

## 2023-10-12 PROCEDURE — 29880 ARTHRS KNE SRG MNISECTMY M&L: CPT | Mod: RT

## 2023-10-12 PROCEDURE — 97161 PT EVAL LOW COMPLEX 20 MIN: CPT

## 2023-10-12 RX ORDER — HYDROMORPHONE HYDROCHLORIDE 2 MG/ML
0.5 INJECTION INTRAMUSCULAR; INTRAVENOUS; SUBCUTANEOUS
Refills: 0 | Status: DISCONTINUED | OUTPATIENT
Start: 2023-10-12 | End: 2023-10-12

## 2023-10-12 RX ORDER — APIXABAN 2.5 MG/1
1 TABLET, FILM COATED ORAL
Refills: 0 | DISCHARGE

## 2023-10-12 RX ORDER — TOLTERODINE TARTRATE 1 MG/1
1 TABLET, FILM COATED ORAL
Refills: 0 | DISCHARGE

## 2023-10-12 RX ORDER — APREPITANT 80 MG/1
40 CAPSULE ORAL ONCE
Refills: 0 | Status: COMPLETED | OUTPATIENT
Start: 2023-10-12 | End: 2023-10-12

## 2023-10-12 RX ORDER — OXYCODONE HYDROCHLORIDE 5 MG/1
5 TABLET ORAL ONCE
Refills: 0 | Status: DISCONTINUED | OUTPATIENT
Start: 2023-10-12 | End: 2023-10-12

## 2023-10-12 RX ORDER — OMEPRAZOLE 10 MG/1
1 CAPSULE, DELAYED RELEASE ORAL
Refills: 0 | DISCHARGE

## 2023-10-12 RX ORDER — CEFAZOLIN SODIUM 1 G
2000 VIAL (EA) INJECTION ONCE
Refills: 0 | Status: COMPLETED | OUTPATIENT
Start: 2023-10-12 | End: 2023-10-12

## 2023-10-12 RX ORDER — TIRZEPATIDE 15 MG/.5ML
10 INJECTION, SOLUTION SUBCUTANEOUS
Refills: 0 | DISCHARGE

## 2023-10-12 RX ORDER — SODIUM CHLORIDE 9 MG/ML
1000 INJECTION, SOLUTION INTRAVENOUS
Refills: 0 | Status: DISCONTINUED | OUTPATIENT
Start: 2023-10-12 | End: 2023-10-26

## 2023-10-12 RX ORDER — GABAPENTIN 400 MG/1
1 CAPSULE ORAL
Refills: 0 | DISCHARGE

## 2023-10-12 RX ORDER — ONDANSETRON 8 MG/1
4 TABLET, FILM COATED ORAL ONCE
Refills: 0 | Status: DISCONTINUED | OUTPATIENT
Start: 2023-10-12 | End: 2023-10-12

## 2023-10-12 RX ADMIN — SODIUM CHLORIDE 100 MILLILITER(S): 9 INJECTION, SOLUTION INTRAVENOUS at 09:02

## 2023-10-12 RX ADMIN — CHLORHEXIDINE GLUCONATE 1 APPLICATION(S): 213 SOLUTION TOPICAL at 06:44

## 2023-10-12 RX ADMIN — APREPITANT 40 MILLIGRAM(S): 80 CAPSULE ORAL at 06:42

## 2023-10-12 NOTE — ASU DISCHARGE PLAN (ADULT/PEDIATRIC) - ACTIVITY LEVEL
Group Topic: BH CBT    Date: 8/25/2023  Start Time: 1300  End Time: 1345  Facilitators: José Manuel Hurtado LCSW    Focus:   acceptance paradox.   Number in attendance: 7    Method: Group  Attendance: Present  Participation: Minimal  Patient Response: Attentive  Patient Evaluation: left group early        Weight bearing as tolerated

## 2023-10-12 NOTE — ASU DISCHARGE PLAN (ADULT/PEDIATRIC) - CARE PROVIDER_API CALL
Lico Abad Jesus  Orthopaedic Surgery  [address] [place] [address]  Phone: [phone]  Fax: [phone]  Follow Up Time:

## 2023-10-24 ENCOUNTER — NON-APPOINTMENT (OUTPATIENT)
Age: 58
End: 2023-10-24

## 2023-10-24 PROBLEM — Z86.69 PERSONAL HISTORY OF OTHER DISEASES OF THE NERVOUS SYSTEM AND SENSE ORGANS: Chronic | Status: ACTIVE | Noted: 2023-09-29

## 2023-10-24 PROBLEM — N32.81 OVERACTIVE BLADDER: Chronic | Status: ACTIVE | Noted: 2023-09-29

## 2023-10-24 PROBLEM — K76.0 FATTY (CHANGE OF) LIVER, NOT ELSEWHERE CLASSIFIED: Chronic | Status: ACTIVE | Noted: 2023-09-29

## 2023-10-24 PROBLEM — G47.33 OBSTRUCTIVE SLEEP APNEA (ADULT) (PEDIATRIC): Chronic | Status: ACTIVE | Noted: 2023-09-29

## 2023-10-24 PROBLEM — S83.209A UNSPECIFIED TEAR OF UNSPECIFIED MENISCUS, CURRENT INJURY, UNSPECIFIED KNEE, INITIAL ENCOUNTER: Chronic | Status: ACTIVE | Noted: 2023-09-29

## 2023-11-01 ENCOUNTER — NON-APPOINTMENT (OUTPATIENT)
Age: 58
End: 2023-11-01

## 2023-11-01 ENCOUNTER — APPOINTMENT (OUTPATIENT)
Dept: OTOLARYNGOLOGY | Facility: CLINIC | Age: 58
End: 2023-11-01
Payer: COMMERCIAL

## 2023-11-01 VITALS
SYSTOLIC BLOOD PRESSURE: 111 MMHG | HEART RATE: 73 BPM | WEIGHT: 135 LBS | BODY MASS INDEX: 22.49 KG/M2 | HEIGHT: 65 IN | DIASTOLIC BLOOD PRESSURE: 76 MMHG

## 2023-11-01 PROCEDURE — 31575 DIAGNOSTIC LARYNGOSCOPY: CPT

## 2023-11-01 PROCEDURE — 99203 OFFICE O/P NEW LOW 30 MIN: CPT | Mod: 25

## 2023-11-22 ENCOUNTER — APPOINTMENT (OUTPATIENT)
Dept: OTOLARYNGOLOGY | Facility: CLINIC | Age: 58
End: 2023-11-22
Payer: COMMERCIAL

## 2023-11-22 ENCOUNTER — APPOINTMENT (OUTPATIENT)
Dept: OTOLARYNGOLOGY | Facility: CLINIC | Age: 58
End: 2023-11-22

## 2023-11-22 PROCEDURE — 92524 BEHAVRAL QUALIT ANALYS VOICE: CPT | Mod: GN

## 2023-11-22 PROCEDURE — 31579 LARYNGOSCOPY TELESCOPIC: CPT | Mod: GN

## 2023-11-29 ENCOUNTER — APPOINTMENT (OUTPATIENT)
Dept: OTOLARYNGOLOGY | Facility: CLINIC | Age: 58
End: 2023-11-29
Payer: COMMERCIAL

## 2023-11-29 VITALS
SYSTOLIC BLOOD PRESSURE: 114 MMHG | DIASTOLIC BLOOD PRESSURE: 70 MMHG | HEART RATE: 88 BPM | WEIGHT: 135 LBS | BODY MASS INDEX: 22.49 KG/M2 | HEIGHT: 65 IN

## 2023-11-29 PROCEDURE — 99213 OFFICE O/P EST LOW 20 MIN: CPT | Mod: 25

## 2023-11-29 PROCEDURE — 31575 DIAGNOSTIC LARYNGOSCOPY: CPT

## 2023-12-29 ENCOUNTER — APPOINTMENT (OUTPATIENT)
Dept: OTOLARYNGOLOGY | Facility: CLINIC | Age: 58
End: 2023-12-29
Payer: COMMERCIAL

## 2023-12-29 PROCEDURE — 92507 TX SP LANG VOICE COMM INDIV: CPT | Mod: GN

## 2024-01-05 ENCOUNTER — APPOINTMENT (OUTPATIENT)
Dept: OTOLARYNGOLOGY | Facility: CLINIC | Age: 59
End: 2024-01-05
Payer: COMMERCIAL

## 2024-01-05 PROCEDURE — 92507 TX SP LANG VOICE COMM INDIV: CPT | Mod: GN

## 2024-01-11 ENCOUNTER — APPOINTMENT (OUTPATIENT)
Dept: OTOLARYNGOLOGY | Facility: CLINIC | Age: 59
End: 2024-01-11
Payer: COMMERCIAL

## 2024-01-11 PROCEDURE — 92507 TX SP LANG VOICE COMM INDIV: CPT | Mod: GN

## 2024-01-19 ENCOUNTER — APPOINTMENT (OUTPATIENT)
Dept: OTOLARYNGOLOGY | Facility: CLINIC | Age: 59
End: 2024-01-19

## 2024-02-01 ENCOUNTER — APPOINTMENT (OUTPATIENT)
Dept: OTOLARYNGOLOGY | Facility: CLINIC | Age: 59
End: 2024-02-01
Payer: COMMERCIAL

## 2024-02-01 PROCEDURE — 92507 TX SP LANG VOICE COMM INDIV: CPT | Mod: GN

## 2024-02-08 ENCOUNTER — APPOINTMENT (OUTPATIENT)
Dept: OTOLARYNGOLOGY | Facility: CLINIC | Age: 59
End: 2024-02-08
Payer: COMMERCIAL

## 2024-02-08 PROCEDURE — 92507 TX SP LANG VOICE COMM INDIV: CPT | Mod: GN

## 2024-02-15 ENCOUNTER — APPOINTMENT (OUTPATIENT)
Dept: OTOLARYNGOLOGY | Facility: CLINIC | Age: 59
End: 2024-02-15

## 2024-02-29 ENCOUNTER — APPOINTMENT (OUTPATIENT)
Dept: OTOLARYNGOLOGY | Facility: CLINIC | Age: 59
End: 2024-02-29
Payer: COMMERCIAL

## 2024-02-29 PROCEDURE — 92507 TX SP LANG VOICE COMM INDIV: CPT | Mod: GN

## 2024-03-07 ENCOUNTER — APPOINTMENT (OUTPATIENT)
Dept: CARDIOLOGY | Facility: CLINIC | Age: 59
End: 2024-03-07
Payer: COMMERCIAL

## 2024-03-07 VITALS
OXYGEN SATURATION: 98 % | BODY MASS INDEX: 22.99 KG/M2 | WEIGHT: 138 LBS | DIASTOLIC BLOOD PRESSURE: 80 MMHG | HEART RATE: 81 BPM | HEIGHT: 65 IN | SYSTOLIC BLOOD PRESSURE: 110 MMHG

## 2024-03-07 DIAGNOSIS — I26.99 OTHER PULMONARY EMBOLISM W/OUT ACUTE COR PULMONALE: ICD-10-CM

## 2024-03-07 DIAGNOSIS — I82.409 ACUTE EMBOLISM AND THROMBOSIS OF UNSPECIFIED DEEP VEINS OF UNSPECIFIED LOWER EXTREMITY: ICD-10-CM

## 2024-03-07 PROCEDURE — 99214 OFFICE O/P EST MOD 30 MIN: CPT

## 2024-03-07 PROCEDURE — G2211 COMPLEX E/M VISIT ADD ON: CPT | Mod: NC,1L

## 2024-03-07 NOTE — ASSESSMENT
[FreeTextEntry1] : Assessment: 1.  Fatty Liver - pending liver doc 2.  Provoked DVT /PE 3. Still with chronic changes on recent duplex 4.  Now on half dose a/c 5.  CT showed no PE Sept 29th   Plan: 1.   She should continue eliquis 2.5mg BID for now... given her chronic DVT on the right and also her mother's history of VTE.  2.  If she is to fly, she should take an additional eliquis 2.5mg 1 hour prior to flight 3   She still has chronic DVT on the right pop -will order repeat venous duplex.   5.  In the future, we can consider cessation if DVT resolves, such as aspirin paried with DVT ppx for high risk situations. Not yet 6.  Return in 6 months

## 2024-03-07 NOTE — REASON FOR VISIT
[Follow-Up - Clinic] : a clinic follow-up of [FreeTextEntry1] : 3/7/2024  Remains on eliquis 2.5mg BID Had R meniscus surgery - Oct 2023 Not skipping any doses. Was diagnosed with MAXWELL  Review of ultrasound from August again shows chronic DVT in the r pop   Meds: Gabapentin Eliquis 2.5mg BID Omeprazole as needed  Mounjaro  80/17/2023 Remains on eliquis 2.5mg BID Followed by Dr. Montez No repeat venous duplex She reports that she will need arthroscopy of the knee - R knee.  Dr. Conde Notes that her ematologist might take her off ac in the fall. Seen by rheum - no issues has varices seen by liver - sent for MRE  Duplex in march - although read as no DVT, upon review of images and body of text, there is chronic DVT in the right poplietal vein       57F here to establish care  Followed by Dr. Thomas Low titer of Cardiolipin IgG 13.9, on repeat was normal B2GP negative Dimer in Sept 2022 negative  She is still going to PT She was on eliquis 5mg BID , now is on Eliquis 2.5mg BID - this was reduced recently by hematology.    Meds: Gabapentin Eliquis 2.5mg BID Trazadone Omeprazole  Coloscopy - polyps 4 years ago, no cancer. Mammogram- normal Pap smear - ok   Mother with history of DVT.   Hematology:  hon montez  Venos duplex 10/28/2022 Chronic incompletely occlusive thrombus in the right pop and post tibitial vins   Duplex 7/25/2022:  R FV, Pop and PT V   4/13/2022:  DVT mid and distal femoral veins and pop and PT V  Prothrombin negative FVL negative    Testing and hospital stay below: VESSELS: Within normal limits. The IVC and pelvic veins opacify unremarkably. RETROPERITONEUM/LYMPH NODES: No lymphadenopathy. ABDOMINAL WALL: Within normal limits. BONES: Within normal limits.  IMPRESSION: Right lower lobe pulmonary embolism with small infarct. Findings communicated to Dr. Donis at time of interpretation.    RIGHT: Acute DVT is identified within the right common femoral vein, femoral vein, popliteal vein as well as visualized segments of the posterior tibial vein and peroneal vein.  LEFT: Normal compressibility of the LEFT common femoral, femoral and popliteal veins. Doppler examination shows normal spontaneous and phasic flow. No LEFT calf vein thrombosis is detected.  IMPRESSION: Extensive acute DVT right lower extremity. No evidence for left lower extremity DVT.  From hospital stay in Spring of 2022:  This is a 57 y/o Female with no significant PMHx who in fractured her right proximal tibia in december and has been in a knee immobilizer since. Yesterday she presented to the ED with RUQ pain and a fever (101.0) and was found to have a right lower lobe pulmonary embolism. She was then found to have an extensive RLE DVT involving the common femoral, femoral, popliteal posterior tibial and peroneal veins. She denies pain of the right leg from the hip to the toes, denies swelling, color changes of the skin, changes in sensation or changes in the ability to move the extremity. Denies a history of blood clots. Reports that over the last week she has been more sedentary than usual, stating that her father returned from the hospital so she has been sitting with him since he's been home.

## 2024-03-14 ENCOUNTER — APPOINTMENT (OUTPATIENT)
Dept: OTOLARYNGOLOGY | Facility: CLINIC | Age: 59
End: 2024-03-14
Payer: COMMERCIAL

## 2024-03-14 VITALS
HEART RATE: 120 BPM | WEIGHT: 130 LBS | BODY MASS INDEX: 21.66 KG/M2 | HEIGHT: 65 IN | SYSTOLIC BLOOD PRESSURE: 104 MMHG | DIASTOLIC BLOOD PRESSURE: 74 MMHG

## 2024-03-14 DIAGNOSIS — J31.0 CHRONIC RHINITIS: ICD-10-CM

## 2024-03-14 PROCEDURE — 99213 OFFICE O/P EST LOW 20 MIN: CPT | Mod: 25

## 2024-03-14 PROCEDURE — 31231 NASAL ENDOSCOPY DX: CPT

## 2024-03-14 RX ORDER — CHROMIUM 200 MCG
25 MCG TABLET ORAL
Refills: 0 | Status: ACTIVE | COMMUNITY

## 2024-03-14 RX ORDER — TROSPIUM CHLORIDE 20 MG/1
TABLET, FILM COATED ORAL
Refills: 0 | Status: ACTIVE | COMMUNITY

## 2024-03-14 RX ORDER — GABAPENTIN 300 MG
300 TABLET ORAL
Refills: 0 | Status: ACTIVE | COMMUNITY

## 2024-03-14 RX ORDER — DULAGLUTIDE 0.75 MG/.5ML
0.75 INJECTION, SOLUTION SUBCUTANEOUS
Refills: 0 | Status: ACTIVE | COMMUNITY

## 2024-03-14 NOTE — PHYSICAL EXAM
[] : septum deviated to the right [de-identified] : MILD IRRITATION [Normal] : mucosa is normal [Midline] : trachea located in midline position

## 2024-03-14 NOTE — HISTORY OF PRESENT ILLNESS
[de-identified] : RECURRENT RHINITIS DYSPHONIA IMPROVING SENSE OF SMELL REDUCED TASTE WNL MEDICAL HX REVIEWED

## 2024-03-14 NOTE — ASSESSMENT
[FreeTextEntry1] : EUCALYPTUS HUMIDIFIER NASAL SALINE CONTINUE SPEECH THERAPY FLONASE PRN F/U 3 MONTHS PRN

## 2024-03-21 ENCOUNTER — APPOINTMENT (OUTPATIENT)
Dept: OTOLARYNGOLOGY | Facility: CLINIC | Age: 59
End: 2024-03-21
Payer: COMMERCIAL

## 2024-03-21 PROCEDURE — 92507 TX SP LANG VOICE COMM INDIV: CPT | Mod: GN

## 2024-03-27 NOTE — ASU PREOP CHECKLIST - HEART RATE (BEATS/MIN)
Patients spouse Garrett called and stated that from the 11 th to now the patient has gained more than 3 lbs.     She was 200 lbs and now she is 221 lbs    They want to know what they need to do.       74

## 2024-03-29 ENCOUNTER — APPOINTMENT (OUTPATIENT)
Dept: OTOLARYNGOLOGY | Facility: CLINIC | Age: 59
End: 2024-03-29

## 2024-03-30 ENCOUNTER — OUTPATIENT (OUTPATIENT)
Dept: OUTPATIENT SERVICES | Facility: HOSPITAL | Age: 59
LOS: 1 days | End: 2024-03-30
Payer: COMMERCIAL

## 2024-03-30 ENCOUNTER — APPOINTMENT (OUTPATIENT)
Dept: ULTRASOUND IMAGING | Facility: CLINIC | Age: 59
End: 2024-03-30
Payer: COMMERCIAL

## 2024-03-30 DIAGNOSIS — I82.409 ACUTE EMBOLISM AND THROMBOSIS OF UNSPECIFIED DEEP VEINS OF UNSPECIFIED LOWER EXTREMITY: ICD-10-CM

## 2024-03-30 DIAGNOSIS — Z98.890 OTHER SPECIFIED POSTPROCEDURAL STATES: Chronic | ICD-10-CM

## 2024-03-30 PROCEDURE — 93970 EXTREMITY STUDY: CPT | Mod: 26

## 2024-03-30 PROCEDURE — 93970 EXTREMITY STUDY: CPT

## 2024-04-03 ENCOUNTER — APPOINTMENT (OUTPATIENT)
Dept: OTOLARYNGOLOGY | Facility: CLINIC | Age: 59
End: 2024-04-03

## 2024-04-04 ENCOUNTER — APPOINTMENT (OUTPATIENT)
Dept: OTOLARYNGOLOGY | Facility: CLINIC | Age: 59
End: 2024-04-04
Payer: COMMERCIAL

## 2024-04-04 PROCEDURE — 92507 TX SP LANG VOICE COMM INDIV: CPT | Mod: GN

## 2024-04-05 NOTE — H&P PST ADULT - BP NONINVASIVE DIASTOLIC (MM HG)
Thank you for choosing [facility]. It was my pleasure to be involved in your care today.         As of today's visit, based on reasonable likelihood, that it is safe for you to be discharged back to your residence to follow-up as an outpatient for ongoing management of your medical problem. You should follow-up with any referrals / primary provider as soon as possible. The contacts (number, addresses) are listed below.         *Return to us immediately, if you feel you are getting worse, not getting better, or any new symptoms develop.         Make sure your pharmacy and primary doctor is aware of any new medications prescribed today.          It is your responsibility to contact as soon as possible, and follow through with, any referrals you were given today. We do recommend you inform them you are a [facility] follow-up patient, as often they can better accommodate your need to be seen, provided their schedules allow. We will, and have, made every effort to ensure you have access to adequate follow-up specialists available.          All problems may not be able to be fixed in one ER visit. This is why timely ongoing care is important, and this is a responsibility you share in. Further, you are free to follow up with any provider you choose, and this is not limited to our suggestion.          If cultures were obtained today, you will be contacted should anything result that would require further treatment. Please contact the ED at the number provided with questions.          Having trouble affording medications? Try [email or web address]! (This is not a hospital endorsed website, merely a recommendation based on my own personal experiences with [employer])  
74

## 2024-04-11 ENCOUNTER — APPOINTMENT (OUTPATIENT)
Dept: OTOLARYNGOLOGY | Facility: CLINIC | Age: 59
End: 2024-04-11
Payer: COMMERCIAL

## 2024-04-11 PROCEDURE — 92507 TX SP LANG VOICE COMM INDIV: CPT | Mod: GN

## 2024-04-24 NOTE — ED ADULT NURSE NOTE - ED CARDIAC CAPILLARY REFILL
[facility]  Hyperbaric Oxygen Therapy   Progress Note    NAME: Florentin Stark  MEDICAL RECORD NUMBER:  [id number]  AGE: 67 y.o.   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  See doc flowsheets for documentation.  Objective      Please see lab results for finger stick glucose results  Pre treatment Vital Signs       Temp: 98.9 °F (37.2 °C)     Pulse: 76     Respirations: 20     BP: (!) 153/76     Post treatment Vital Signs  Temp: 96.8 °F (36 °C)  Pulse: 86  Respirations: 20  BP: (!) 128/59  Assessment      Physical Exam:  General Appearance:  alert and oriented to person, place and time, well-developed and well-nourished, in no acute distress  ENT:  tympanic membranes intact bilaterally  Pulmonary/Chest:  clear to auscultation bilaterally- no wheezes, rales or rhonchi, normal air movement, no respiratory distress  Cardiovascular:  S1S2  Chamber #: [id number]  Treatment Start Time: 1013     Pressure Reached Time: 1023  ALEXUS : 2  Number of Air Breaks:        Decompression Time: 1153   Treatment End Time: 1200  Length of Treatment: 90 Minutes  Symptoms Noted During Treatment: None  Total Treatment Time (min): 107    Adverse Event: no    I was present on these premises and immediately available to furnish assistance & direction throughout the procedure.   Plan      Florentin Stark is a 67 y.o. male  did successfully  2 seconds or less

## 2024-05-16 ENCOUNTER — APPOINTMENT (OUTPATIENT)
Dept: OTOLARYNGOLOGY | Facility: CLINIC | Age: 59
End: 2024-05-16
Payer: COMMERCIAL

## 2024-05-16 PROCEDURE — 92507 TX SP LANG VOICE COMM INDIV: CPT | Mod: GN

## 2024-06-13 ENCOUNTER — APPOINTMENT (OUTPATIENT)
Dept: OTOLARYNGOLOGY | Facility: CLINIC | Age: 59
End: 2024-06-13
Payer: COMMERCIAL

## 2024-06-13 VITALS
DIASTOLIC BLOOD PRESSURE: 76 MMHG | HEIGHT: 65 IN | SYSTOLIC BLOOD PRESSURE: 119 MMHG | BODY MASS INDEX: 23.32 KG/M2 | HEART RATE: 66 BPM | WEIGHT: 140 LBS

## 2024-06-13 DIAGNOSIS — R49.0 DYSPHONIA: ICD-10-CM

## 2024-06-13 PROCEDURE — 31575 DIAGNOSTIC LARYNGOSCOPY: CPT

## 2024-06-13 PROCEDURE — 99213 OFFICE O/P EST LOW 20 MIN: CPT | Mod: 25

## 2024-06-13 NOTE — ASSESSMENT
[FreeTextEntry1] : EUCALYPTUS HUMIDIFIER NASAL SALINE CONTINUE SPEECH THERAPY AT HOME AS INSTRUCTED FLONASE PRN F/U 3 MONTHS PRN DYSPHONIA IMPROVED

## 2024-06-13 NOTE — PHYSICAL EXAM
[] : septum deviated to the right [Normal] : mucosa is normal [Midline] : trachea located in midline position [de-identified] : MILD IRRITATION

## 2024-06-22 ENCOUNTER — APPOINTMENT (OUTPATIENT)
Dept: MRI IMAGING | Facility: CLINIC | Age: 59
End: 2024-06-22
Payer: COMMERCIAL

## 2024-06-22 ENCOUNTER — OUTPATIENT (OUTPATIENT)
Dept: OUTPATIENT SERVICES | Facility: HOSPITAL | Age: 59
LOS: 1 days | End: 2024-06-22
Payer: COMMERCIAL

## 2024-06-22 DIAGNOSIS — K76.89 OTHER SPECIFIED DISEASES OF LIVER: ICD-10-CM

## 2024-06-22 DIAGNOSIS — Z98.890 OTHER SPECIFIED POSTPROCEDURAL STATES: Chronic | ICD-10-CM

## 2024-06-22 PROCEDURE — 74183 MRI ABD W/O CNTR FLWD CNTR: CPT | Mod: 26

## 2024-06-22 PROCEDURE — 74183 MRI ABD W/O CNTR FLWD CNTR: CPT

## 2024-06-22 PROCEDURE — A9581: CPT

## 2024-06-26 ENCOUNTER — APPOINTMENT (OUTPATIENT)
Dept: HEPATOLOGY | Facility: CLINIC | Age: 59
End: 2024-06-26
Payer: COMMERCIAL

## 2024-06-26 PROCEDURE — 99211 OFF/OP EST MAY X REQ PHY/QHP: CPT

## 2024-09-04 ENCOUNTER — NON-APPOINTMENT (OUTPATIENT)
Age: 59
End: 2024-09-04

## 2024-09-05 ENCOUNTER — APPOINTMENT (OUTPATIENT)
Dept: CARDIOLOGY | Facility: CLINIC | Age: 59
End: 2024-09-05
Payer: COMMERCIAL

## 2024-09-05 VITALS
SYSTOLIC BLOOD PRESSURE: 110 MMHG | WEIGHT: 142 LBS | DIASTOLIC BLOOD PRESSURE: 72 MMHG | HEIGHT: 65 IN | BODY MASS INDEX: 23.66 KG/M2 | OXYGEN SATURATION: 97 % | HEART RATE: 76 BPM

## 2024-09-05 DIAGNOSIS — R79.89 OTHER SPECIFIED ABNORMAL FINDINGS OF BLOOD CHEMISTRY: ICD-10-CM

## 2024-09-05 DIAGNOSIS — I82.409 ACUTE EMBOLISM AND THROMBOSIS OF UNSPECIFIED DEEP VEINS OF UNSPECIFIED LOWER EXTREMITY: ICD-10-CM

## 2024-09-05 PROCEDURE — 99214 OFFICE O/P EST MOD 30 MIN: CPT

## 2024-09-05 PROCEDURE — G2211 COMPLEX E/M VISIT ADD ON: CPT | Mod: NC

## 2024-09-05 NOTE — REASON FOR VISIT
[Follow-Up - Clinic] : a clinic follow-up of [FreeTextEntry2] : VTE [FreeTextEntry1] : 9/5/2024   Remains on eliquis  Knee still bothering her.  Had meniscus sx oct 2023, needs to see ortho soon total orthopedics - Dr. Perez Left knee is ok  She took an extra dose of eliquis for the flights She has a trip to [facility] in November, direct flight.    Meds: Gabapentin Eliquis 2.5mg BID Omeprazole as needed Zepbound now, 5.0mg Trospium     3/7/2024  Remains on eliquis 2.5mg BID Had R meniscus surgery - Oct 2023 Not skipping any doses. Was diagnosed with MAXWELL  Review of ultrasound from August again shows chronic DVT in the r pop   Meds: Gabapentin Eliquis 2.5mg BID Omeprazole as needed  Mounjaro  80/17/2023 Remains on eliquis 2.5mg BID Followed by Dr. Tc Eaton repeat venous duplex She reports that she will need arthroscopy of the knee - R knee.  Dr. Conde Notes that her ematologist might take her off ac in the fall. Seen by rheum - no issues has varices seen by liver - sent for MRE  Duplex in march - although read as no DVT, upon review of images and body of text, there is chronic DVT in the right poplietal vein       57F here to establish care  Followed by Dr. Thomas Low titer of Cardiolipin IgG 13.9, on repeat was normal B2GP negative Dimer in Sept 2022 negative  She is still going to PT She was on eliquis 5mg BID , now is on Eliquis 2.5mg BID - this was reduced recently by hematology.    Meds: Gabapentin Eliquis 2.5mg BID Trazadone Omeprazole  Coloscopy - polyps 4 years ago, no cancer. Mammogram- normal Pap smear - ok   Mother with history of DVT.   Hematology:  hon irwin  Venos duplex 10/28/2022 Chronic incompletely occlusive thrombus in the right pop and post tibitial vins   Duplex 7/25/2022:  R FV, Pop and PT V   4/13/2022:  DVT mid and distal femoral veins and pop and PT V  Prothrombin negative FVL negative    Testing and hospital stay below: VESSELS: Within normal limits. The IVC and pelvic veins opacify unremarkably. RETROPERITONEUM/LYMPH NODES: No lymphadenopathy. ABDOMINAL WALL: Within normal limits. BONES: Within normal limits.  IMPRESSION: Right lower lobe pulmonary embolism with small infarct. Findings communicated to Dr. Donis at time of interpretation.    RIGHT: Acute DVT is identified within the right common femoral vein, femoral vein, popliteal vein as well as visualized segments of the posterior tibial vein and peroneal vein.  LEFT: Normal compressibility of the LEFT common femoral, femoral and popliteal veins. Doppler examination shows normal spontaneous and phasic flow. No LEFT calf vein thrombosis is detected.  IMPRESSION: Extensive acute DVT right lower extremity. No evidence for left lower extremity DVT.  From hospital stay in Spring of 2022:  This is a 57 y/o Female with no significant PMHx who in fractured her right proximal tibia in december and has been in a knee immobilizer since. Yesterday she presented to the ED with RUQ pain and a fever (101.0) and was found to have a right lower lobe pulmonary embolism. She was then found to have an extensive RLE DVT involving the common femoral, femoral, popliteal posterior tibial and peroneal veins. She denies pain of the right leg from the hip to the toes, denies swelling, color changes of the skin, changes in sensation or changes in the ability to move the extremity. Denies a history of blood clots. Reports that over the last week she has been more sedentary than usual, stating that her father returned from the hospital so she has been sitting with him since he's been home.

## 2024-09-05 NOTE — ASSESSMENT
[FreeTextEntry1] : Assessment: 1.  Fatty Liver - pending liver doc 2.  Provoked DVT /PE 3. Still with chronic changes on recent duplex 4.  Now on half dose a/c 5.  CT showed no PE Sept 29th   Plan: 1.   She has been on A/C since 2022, duplex negative.   2.  Will attempt to STOP A/C 3.   Will keep in mind her mothers history of DVT 4.  Discussed risk of recurrence 5.  Send d-dimer today.  If normal, will stop Eliquis, start aspirin, and repeat dimer and duplex in 4 weeks. 6.  For flights, she knowns to take 5 mg of Eliquis 1 hour prior to flights if we take her off.  Hold aspirin on days that she takes Eliquis.   She will let me know if any ortho procedures are planned.    Await dimer.

## 2024-09-05 NOTE — REASON FOR VISIT
[Follow-Up - Clinic] : a clinic follow-up of [FreeTextEntry2] : VTE [FreeTextEntry1] : 9/5/2024   Remains on eliquis  Knee still bothering her.  Had meniscus sx oct 2023, needs to see ortho soon total orthopedics - Dr. Perez Left knee is ok  She took an extra dose of eliquis for the flights She has a trip to [facility] in November, direct flight.    Meds: Gabapentin Eliquis 2.5mg BID Omeprazole as needed Zepbound now, 5.0mg Trospium     3/7/2024  Remains on eliquis 2.5mg BID Had R meniscus surgery - Oct 2023 Not skipping any doses. Was diagnosed with MAXWELL  Review of ultrasound from August again shows chronic DVT in the r pop   Meds: Gabapentin Eliquis 2.5mg BID Omeprazole as needed  Mounjaro  80/17/2023 Remains on eliquis 2.5mg BID Followed by Dr. Tc Eaton repeat venous duplex She reports that she will need arthroscopy of the knee - R knee.  Dr. Conde Notes that her ematologist might take her off ac in the fall. Seen by rheum - no issues has varices seen by liver - sent for MRE  Duplex in march - although read as no DVT, upon review of images and body of text, there is chronic DVT in the right poplietal vein       57F here to establish care  Followed by Dr. Thomas Low titer of Cardiolipin IgG 13.9, on repeat was normal B2GP negative Dimer in Sept 2022 negative  She is still going to PT She was on eliquis 5mg BID , now is on Eliquis 2.5mg BID - this was reduced recently by hematology.    Meds: Gabapentin Eliquis 2.5mg BID Trazadone Omeprazole  Coloscopy - polyps 4 years ago, no cancer. Mammogram- normal Pap smear - ok   Mother with history of DVT.   Hematology:  hon irwin  Venos duplex 10/28/2022 Chronic incompletely occlusive thrombus in the right pop and post tibitial vins   Duplex 7/25/2022:  R FV, Pop and PT V   4/13/2022:  DVT mid and distal femoral veins and pop and PT V  Prothrombin negative FVL negative    Testing and hospital stay below: VESSELS: Within normal limits. The IVC and pelvic veins opacify unremarkably. RETROPERITONEUM/LYMPH NODES: No lymphadenopathy. ABDOMINAL WALL: Within normal limits. BONES: Within normal limits.  IMPRESSION: Right lower lobe pulmonary embolism with small infarct. Findings communicated to Dr. Donis at time of interpretation.    RIGHT: Acute DVT is identified within the right common femoral vein, femoral vein, popliteal vein as well as visualized segments of the posterior tibial vein and peroneal vein.  LEFT: Normal compressibility of the LEFT common femoral, femoral and popliteal veins. Doppler examination shows normal spontaneous and phasic flow. No LEFT calf vein thrombosis is detected.  IMPRESSION: Extensive acute DVT right lower extremity. No evidence for left lower extremity DVT.  From hospital stay in Spring of 2022:  This is a 57 y/o Female with no significant PMHx who in fractured her right proximal tibia in december and has been in a knee immobilizer since. Yesterday she presented to the ED with RUQ pain and a fever (101.0) and was found to have a right lower lobe pulmonary embolism. She was then found to have an extensive RLE DVT involving the common femoral, femoral, popliteal posterior tibial and peroneal veins. She denies pain of the right leg from the hip to the toes, denies swelling, color changes of the skin, changes in sensation or changes in the ability to move the extremity. Denies a history of blood clots. Reports that over the last week she has been more sedentary than usual, stating that her father returned from the hospital so she has been sitting with him since he's been home.

## 2024-09-06 LAB — DEPRECATED D DIMER PPP IA-ACNC: <150 NG/ML DDU

## 2024-09-09 DIAGNOSIS — Z86.718 PERSONAL HISTORY OF OTHER VENOUS THROMBOSIS AND EMBOLISM: ICD-10-CM

## 2024-09-09 DIAGNOSIS — Z00.00 ENCOUNTER FOR GENERAL ADULT MEDICAL EXAMINATION W/OUT ABNORMAL FINDINGS: ICD-10-CM

## 2024-09-09 RX ORDER — ASPIRIN 81 MG/1
81 TABLET, DELAYED RELEASE ORAL
Qty: 90 | Refills: 3 | Status: ACTIVE | COMMUNITY
Start: 2024-09-09 | End: 1900-01-01

## 2024-09-18 ENCOUNTER — APPOINTMENT (OUTPATIENT)
Dept: OTOLARYNGOLOGY | Facility: CLINIC | Age: 59
End: 2024-09-18
Payer: COMMERCIAL

## 2024-09-18 VITALS
WEIGHT: 142 LBS | DIASTOLIC BLOOD PRESSURE: 79 MMHG | SYSTOLIC BLOOD PRESSURE: 125 MMHG | BODY MASS INDEX: 23.66 KG/M2 | HEIGHT: 65 IN | HEART RATE: 72 BPM

## 2024-09-18 DIAGNOSIS — J31.0 CHRONIC RHINITIS: ICD-10-CM

## 2024-09-18 DIAGNOSIS — J34.2 DEVIATED NASAL SEPTUM: ICD-10-CM

## 2024-09-18 DIAGNOSIS — H91.10 PRESBYCUSIS, UNSPECIFIED EAR: ICD-10-CM

## 2024-09-18 PROCEDURE — 31231 NASAL ENDOSCOPY DX: CPT

## 2024-09-18 PROCEDURE — 99213 OFFICE O/P EST LOW 20 MIN: CPT | Mod: 25

## 2024-09-18 NOTE — ASSESSMENT
[FreeTextEntry1] :  DONE FROM  REVIEWED ALMOST UNCHANGED HEARING AIDS CLEARANCE PROVIDED EUCALYPTUS HUMIDIFIER BREATHRIGHT STRIPS F/U AUDIOLOGY F/U 6 MONTHS PRN

## 2024-09-18 NOTE — REVIEW OF SYSTEMS
From: Osvaldo Crouch  To: Catarino Badillo MD  Sent: 3/23/2020 9:46 PM CDT  Subject: Non-Urgent Medical Question    This message is being sent by Emely Crouch on behalf of Osvaldo Crouch    Good Evening,    [place] property in [place], [place]. Ovsaldo had a tick stuck in the back of head. No idea how long it was in there, but was pretty size able. We did get it out with a Tweezer. I am wondering if we should do an antibiotic. With all this craziness, I don’t want to wait until we have a bigger issue.     Thanks for your time.    Emely   [Patient Intake Form Reviewed] : Patient intake form was reviewed

## 2024-09-18 NOTE — PHYSICAL EXAM
[] : septum deviated to the left [de-identified] : NASAL MUCOSAL IRRITATION [Normal] : mucosa is normal [Midline] : trachea located in midline position

## 2024-09-18 NOTE — HISTORY OF PRESENT ILLNESS
[de-identified] : NASAL CONGESTION/ IRRITATION AT TIMES CLEARANCE FOR HEARING AIDS DYSPHONIA IMPROVED MEDICAL HX REVIEWED

## 2024-10-14 ENCOUNTER — APPOINTMENT (OUTPATIENT)
Dept: ULTRASOUND IMAGING | Facility: CLINIC | Age: 59
End: 2024-10-14
Payer: COMMERCIAL

## 2024-10-14 ENCOUNTER — OUTPATIENT (OUTPATIENT)
Dept: OUTPATIENT SERVICES | Facility: HOSPITAL | Age: 59
LOS: 1 days | End: 2024-10-14
Payer: COMMERCIAL

## 2024-10-14 DIAGNOSIS — Z98.890 OTHER SPECIFIED POSTPROCEDURAL STATES: Chronic | ICD-10-CM

## 2024-10-14 DIAGNOSIS — Z86.718 PERSONAL HISTORY OF OTHER VENOUS THROMBOSIS AND EMBOLISM: ICD-10-CM

## 2024-10-14 PROCEDURE — 93970 EXTREMITY STUDY: CPT

## 2024-10-14 PROCEDURE — 93970 EXTREMITY STUDY: CPT | Mod: 26

## 2024-12-12 ENCOUNTER — NON-APPOINTMENT (OUTPATIENT)
Age: 59
End: 2024-12-12

## 2024-12-12 ENCOUNTER — APPOINTMENT (OUTPATIENT)
Dept: CARDIOLOGY | Facility: CLINIC | Age: 59
End: 2024-12-12
Payer: COMMERCIAL

## 2024-12-12 VITALS
OXYGEN SATURATION: 99 % | BODY MASS INDEX: 24.13 KG/M2 | HEART RATE: 75 BPM | SYSTOLIC BLOOD PRESSURE: 126 MMHG | WEIGHT: 145 LBS | DIASTOLIC BLOOD PRESSURE: 83 MMHG

## 2024-12-12 DIAGNOSIS — I82.409 ACUTE EMBOLISM AND THROMBOSIS OF UNSPECIFIED DEEP VEINS OF UNSPECIFIED LOWER EXTREMITY: ICD-10-CM

## 2024-12-12 PROCEDURE — 99214 OFFICE O/P EST MOD 30 MIN: CPT

## 2024-12-12 PROCEDURE — G2211 COMPLEX E/M VISIT ADD ON: CPT | Mod: NC

## 2024-12-12 RX ORDER — OMEPRAZOLE 20 MG/1
20 CAPSULE, DELAYED RELEASE ORAL
Qty: 90 | Refills: 3 | Status: ACTIVE | COMMUNITY
Start: 2024-12-12 | End: 1900-01-01

## 2024-12-19 ENCOUNTER — APPOINTMENT (OUTPATIENT)
Dept: CARDIOLOGY | Facility: CLINIC | Age: 59
End: 2024-12-19

## 2024-12-27 ENCOUNTER — APPOINTMENT (OUTPATIENT)
Dept: ULTRASOUND IMAGING | Facility: CLINIC | Age: 59
End: 2024-12-27
Payer: COMMERCIAL

## 2024-12-27 ENCOUNTER — OUTPATIENT (OUTPATIENT)
Dept: OUTPATIENT SERVICES | Facility: HOSPITAL | Age: 59
LOS: 1 days | End: 2024-12-27
Payer: COMMERCIAL

## 2024-12-27 DIAGNOSIS — I82.409 ACUTE EMBOLISM AND THROMBOSIS OF UNSPECIFIED DEEP VEINS OF UNSPECIFIED LOWER EXTREMITY: ICD-10-CM

## 2024-12-27 DIAGNOSIS — Z98.890 OTHER SPECIFIED POSTPROCEDURAL STATES: Chronic | ICD-10-CM

## 2024-12-27 PROCEDURE — 93970 EXTREMITY STUDY: CPT

## 2024-12-27 PROCEDURE — 93970 EXTREMITY STUDY: CPT | Mod: 26

## 2025-03-20 ENCOUNTER — NON-APPOINTMENT (OUTPATIENT)
Age: 60
End: 2025-03-20

## 2025-05-06 DIAGNOSIS — K76.89 OTHER SPECIFIED DISEASES OF LIVER: ICD-10-CM

## 2025-05-09 ENCOUNTER — APPOINTMENT (OUTPATIENT)
Dept: ULTRASOUND IMAGING | Facility: CLINIC | Age: 60
End: 2025-05-09
Payer: COMMERCIAL

## 2025-05-09 ENCOUNTER — OUTPATIENT (OUTPATIENT)
Dept: OUTPATIENT SERVICES | Facility: HOSPITAL | Age: 60
LOS: 1 days | End: 2025-05-09
Payer: COMMERCIAL

## 2025-05-09 DIAGNOSIS — K76.89 OTHER SPECIFIED DISEASES OF LIVER: ICD-10-CM

## 2025-05-09 DIAGNOSIS — Z98.890 OTHER SPECIFIED POSTPROCEDURAL STATES: Chronic | ICD-10-CM

## 2025-05-09 PROCEDURE — 76700 US EXAM ABDOM COMPLETE: CPT | Mod: 26

## 2025-05-09 PROCEDURE — 76700 US EXAM ABDOM COMPLETE: CPT

## 2025-05-15 ENCOUNTER — TRANSCRIPTION ENCOUNTER (OUTPATIENT)
Age: 60
End: 2025-05-15

## 2025-06-20 NOTE — ED PROVIDER NOTE - CARE PROVIDER_API CALL
Oscar Ballesteros)  Orthopaedic Surgery  [address] [place] [address]  Phone: [phone]  Fax: [phone]  Follow Up Time: [As Noted in HPI] : as noted in HPI

## 2025-09-17 ENCOUNTER — NON-APPOINTMENT (OUTPATIENT)
Age: 60
End: 2025-09-17

## 2025-09-18 ENCOUNTER — NON-APPOINTMENT (OUTPATIENT)
Age: 60
End: 2025-09-18

## 2025-09-18 ENCOUNTER — APPOINTMENT (OUTPATIENT)
Dept: CARDIOLOGY | Facility: CLINIC | Age: 60
End: 2025-09-18
Payer: COMMERCIAL

## 2025-09-18 VITALS
HEIGHT: 65 IN | SYSTOLIC BLOOD PRESSURE: 134 MMHG | DIASTOLIC BLOOD PRESSURE: 78 MMHG | BODY MASS INDEX: 24.16 KG/M2 | HEART RATE: 83 BPM | WEIGHT: 145 LBS | OXYGEN SATURATION: 100 %

## 2025-09-18 DIAGNOSIS — I82.409 ACUTE EMBOLISM AND THROMBOSIS OF UNSPECIFIED DEEP VEINS OF UNSPECIFIED LOWER EXTREMITY: ICD-10-CM

## 2025-09-18 PROCEDURE — G2211 COMPLEX E/M VISIT ADD ON: CPT | Mod: NC

## 2025-09-18 PROCEDURE — 99214 OFFICE O/P EST MOD 30 MIN: CPT

## (undated) DEVICE — DRSG KLING 4"

## (undated) DEVICE — GLV 8 PROTEXIS (WHITE)

## (undated) DEVICE — GLV 6.5 PROTEXIS (WHITE)

## (undated) DEVICE — VENODYNE/SCD SLEEVE CALF MEDIUM

## (undated) DEVICE — SHAVER BLADE LINVATEC ULTRAFRR 3.5MM

## (undated) DEVICE — SHAVER BLADE GREAT WHITE 4.2MM

## (undated) DEVICE — TUBING SET GRAVITY 4 SPIKE

## (undated) DEVICE — NDL SPINAL 18G X 3.5" (PINK)

## (undated) DEVICE — LAP PAD W RING 18 X 18"

## (undated) DEVICE — TUBING DEPUY MITEK FMS OUTFLOW

## (undated) DEVICE — TUBING DEPUY MITEK FMS VUE INFLOW

## (undated) DEVICE — BLADE SURGICAL #15 CARBON

## (undated) DEVICE — SUT PDS II 1 27" CT-1

## (undated) DEVICE — TOURNIQUET CUFF 34" DUAL PORT W PLC

## (undated) DEVICE — S&N ARTHROCARE WAND SUPER TURBOVAC 90 DEGREE ICW

## (undated) DEVICE — ARTHREX SCORPION NEEDLE KNEE

## (undated) DEVICE — WARMING BLANKET UPPER ADULT

## (undated) DEVICE — SOL IRR BAG NS 0.9% 3000ML

## (undated) DEVICE — SHAVER BLADE LINVATEC FULL RADIUS RESECTOR 4.8MM

## (undated) DEVICE — SUT MONOCRYL 3-0 18" PS-1

## (undated) DEVICE — SOL IRR POUR H2O 1000ML

## (undated) DEVICE — PACK KNEE ARTHROSCOPY

## (undated) DEVICE — SOL IRR POUR NS 0.9% 1000ML

## (undated) DEVICE — GLV 7.5 PROTEXIS (WHITE)

## (undated) DEVICE — DRSG COMBINE 5X9"

## (undated) DEVICE — TOURNIQUET ESMARK 6"

## (undated) DEVICE — DRSG CURITY GAUZE SPONGE 4 X 4" 12-PLY

## (undated) DEVICE — DRSG WEBRIL 6"